# Patient Record
Sex: MALE | Race: WHITE | Employment: OTHER | URBAN - METROPOLITAN AREA
[De-identification: names, ages, dates, MRNs, and addresses within clinical notes are randomized per-mention and may not be internally consistent; named-entity substitution may affect disease eponyms.]

---

## 2018-04-30 ENCOUNTER — HOSPITAL ENCOUNTER (OUTPATIENT)
Dept: CT IMAGING | Facility: HOSPITAL | Age: 78
Discharge: HOME | End: 2018-04-30
Attending: INTERNAL MEDICINE
Payer: MEDICARE

## 2018-04-30 ENCOUNTER — HOSPITAL ENCOUNTER (OUTPATIENT)
Dept: GENERAL RADIOLOGY | Facility: HOSPITAL | Age: 78
Discharge: HOME | End: 2018-04-30
Attending: INTERNAL MEDICINE
Payer: MEDICARE

## 2018-04-30 DIAGNOSIS — K74.60 CIRRHOSIS OF LIVER WITHOUT ASCITES, UNSPECIFIED HEPATIC CIRRHOSIS TYPE (HCC): ICD-10-CM

## 2018-04-30 DIAGNOSIS — S29.9XXA INJURY OF CHEST WALL, INITIAL ENCOUNTER: ICD-10-CM

## 2018-04-30 DIAGNOSIS — R16.2 HEPATOSPLENOMEGALY: ICD-10-CM

## 2018-04-30 PROCEDURE — 74150 CT ABDOMEN W/O CONTRAST: CPT | Performed by: INTERNAL MEDICINE

## 2018-04-30 PROCEDURE — 71046 X-RAY EXAM CHEST 2 VIEWS: CPT | Performed by: INTERNAL MEDICINE

## 2018-04-30 PROCEDURE — 71100 X-RAY EXAM RIBS UNI 2 VIEWS: CPT | Performed by: INTERNAL MEDICINE

## 2018-06-15 ENCOUNTER — HOSPITAL ENCOUNTER (EMERGENCY)
Facility: HOSPITAL | Age: 78
Discharge: HOME | DRG: 516 | End: 2018-06-15
Attending: EMERGENCY MEDICINE
Payer: MEDICARE

## 2018-06-15 ENCOUNTER — APPOINTMENT (OUTPATIENT)
Dept: CT IMAGING | Facility: HOSPITAL | Age: 78
DRG: 516 | End: 2018-06-15
Attending: EMERGENCY MEDICINE
Payer: MEDICARE

## 2018-06-15 VITALS
WEIGHT: 240 LBS | BODY MASS INDEX: 33 KG/M2 | OXYGEN SATURATION: 99 % | SYSTOLIC BLOOD PRESSURE: 164 MMHG | DIASTOLIC BLOOD PRESSURE: 94 MMHG | TEMPERATURE: 98 F | HEART RATE: 88 BPM | RESPIRATION RATE: 18 BRPM

## 2018-06-15 DIAGNOSIS — M54.9 SEVERE BACK PAIN: Primary | ICD-10-CM

## 2018-06-15 DIAGNOSIS — S32.009A CLOSED FRACTURE DISLOCATION OF LUMBAR SPINE, INITIAL ENCOUNTER (HCC): ICD-10-CM

## 2018-06-15 PROCEDURE — 81001 URINALYSIS AUTO W/SCOPE: CPT | Performed by: EMERGENCY MEDICINE

## 2018-06-15 PROCEDURE — 96374 THER/PROPH/DIAG INJ IV PUSH: CPT

## 2018-06-15 PROCEDURE — 99285 EMERGENCY DEPT VISIT HI MDM: CPT

## 2018-06-15 PROCEDURE — 85025 COMPLETE CBC W/AUTO DIFF WBC: CPT | Performed by: EMERGENCY MEDICINE

## 2018-06-15 PROCEDURE — 96376 TX/PRO/DX INJ SAME DRUG ADON: CPT

## 2018-06-15 PROCEDURE — 72131 CT LUMBAR SPINE W/O DYE: CPT | Performed by: EMERGENCY MEDICINE

## 2018-06-15 PROCEDURE — 80048 BASIC METABOLIC PNL TOTAL CA: CPT | Performed by: EMERGENCY MEDICINE

## 2018-06-15 RX ORDER — MORPHINE SULFATE 4 MG/ML
2 INJECTION, SOLUTION INTRAMUSCULAR; INTRAVENOUS ONCE
Status: COMPLETED | OUTPATIENT
Start: 2018-06-15 | End: 2018-06-15

## 2018-06-15 RX ORDER — LIDOCAINE 50 MG/G
1 PATCH TOPICAL EVERY 24 HOURS
Qty: 30 PATCH | Refills: 0 | Status: SHIPPED | OUTPATIENT
Start: 2018-06-15 | End: 2019-06-13

## 2018-06-15 RX ORDER — HYDROCODONE BITARTRATE AND ACETAMINOPHEN 5; 325 MG/1; MG/1
1-2 TABLET ORAL EVERY 6 HOURS PRN
Qty: 12 TABLET | Refills: 0 | Status: SHIPPED | OUTPATIENT
Start: 2018-06-15 | End: 2018-06-16

## 2018-06-15 RX ORDER — HYDROCODONE BITARTRATE AND ACETAMINOPHEN 5; 325 MG/1; MG/1
1-2 TABLET ORAL EVERY 6 HOURS PRN
Qty: 12 TABLET | Refills: 0 | Status: ON HOLD | OUTPATIENT
Start: 2018-06-15 | End: 2018-06-20

## 2018-06-15 RX ORDER — DIAZEPAM 5 MG/1
5 TABLET ORAL ONCE
Status: COMPLETED | OUTPATIENT
Start: 2018-06-15 | End: 2018-06-15

## 2018-06-15 RX ORDER — MORPHINE SULFATE 4 MG/ML
4 INJECTION, SOLUTION INTRAMUSCULAR; INTRAVENOUS ONCE
Status: COMPLETED | OUTPATIENT
Start: 2018-06-15 | End: 2018-06-15

## 2018-06-15 NOTE — ED PROVIDER NOTES
Patient Seen in: [facility] AND [facility] Emergency Department    History   Patient presents with:  Back Pain (musculoskeletal)    Stated Complaint: Fall; Back Pain    HPI    69-year-old male patient presents complaining of severe mid low back pain that has bee use: No                Review of Systems    Positive for stated complaint: Fall; Back Pain  Other systems are as noted in HPI. Constitutional and vital signs reviewed. All other systems reviewed and negative except as noted above.     Physical Exam WITH DIFFERENTIAL WITH PLATELET    Narrative: The following orders were created for panel order CBC WITH DIFFERENTIAL WITH PLATELET.   Procedure                               Abnormality         Status                     --------- PM    START taking these medications    !! HYDROcodone-acetaminophen 5-325 MG Oral Tab  Take 1-2 tablets by mouth every 6 (six) hours as needed for Pain., Print Script, Disp-12 tablet, R-0    !! HYDROcodone-acetaminophen 5-325 MG Oral Tab  Take 1-2 tablets

## 2018-06-15 NOTE — ED INITIAL ASSESSMENT (HPI)
Pt here for c/o low/mid back pain s/p fall may 31th. Had xrays done with pcp. Given meloxicam. No improvement in pain, pain got worse. Denies any new bowel/bladder issues. Pt with compression fx on xray, has appt with dr Fredo Kirk on July 6.   Pt states h

## 2018-06-16 ENCOUNTER — HOSPITAL ENCOUNTER (INPATIENT)
Facility: HOSPITAL | Age: 78
LOS: 4 days | Discharge: HOME HEALTH | DRG: 516 | End: 2018-06-20
Attending: EMERGENCY MEDICINE | Admitting: INTERNAL MEDICINE
Payer: MEDICARE

## 2018-06-16 DIAGNOSIS — S32.020A CLOSED COMPRESSION FRACTURE OF SECOND LUMBAR VERTEBRA, INITIAL ENCOUNTER: Primary | ICD-10-CM

## 2018-06-16 PROBLEM — R73.9 HYPERGLYCEMIA: Status: ACTIVE | Noted: 2018-06-16

## 2018-06-16 PROBLEM — S32.010A CLOSED COMPRESSION FRACTURE OF FIRST LUMBAR VERTEBRA (HCC): Status: ACTIVE | Noted: 2018-06-16

## 2018-06-16 PROCEDURE — 94660 CPAP INITIATION&MGMT: CPT

## 2018-06-16 PROCEDURE — 93010 ELECTROCARDIOGRAM REPORT: CPT | Performed by: INTERNAL MEDICINE

## 2018-06-16 PROCEDURE — 96374 THER/PROPH/DIAG INJ IV PUSH: CPT

## 2018-06-16 PROCEDURE — 93005 ELECTROCARDIOGRAM TRACING: CPT

## 2018-06-16 PROCEDURE — 99285 EMERGENCY DEPT VISIT HI MDM: CPT

## 2018-06-16 RX ORDER — LIDOCAINE 50 MG/G
1 PATCH TOPICAL EVERY 24 HOURS
Status: DISCONTINUED | OUTPATIENT
Start: 2018-06-16 | End: 2018-06-20

## 2018-06-16 RX ORDER — ROPINIROLE 0.5 MG/1
0.5 TABLET, FILM COATED ORAL NIGHTLY
Status: DISCONTINUED | OUTPATIENT
Start: 2018-06-16 | End: 2018-06-20

## 2018-06-16 RX ORDER — QUETIAPINE 25 MG/1
25 TABLET, FILM COATED ORAL 2 TIMES DAILY WITH MEALS
Status: DISCONTINUED | OUTPATIENT
Start: 2018-06-16 | End: 2018-06-20

## 2018-06-16 RX ORDER — OYSTER SHELL CALCIUM WITH VITAMIN D 500; 200 MG/1; [IU]/1
1 TABLET, FILM COATED ORAL 2 TIMES DAILY
Status: DISCONTINUED | OUTPATIENT
Start: 2018-06-16 | End: 2018-06-20

## 2018-06-16 RX ORDER — POLYETHYLENE GLYCOL 3350 17 G/17G
17 POWDER, FOR SOLUTION ORAL DAILY PRN
Status: DISCONTINUED | OUTPATIENT
Start: 2018-06-16 | End: 2018-06-20

## 2018-06-16 RX ORDER — HYDROCODONE BITARTRATE AND ACETAMINOPHEN 5; 325 MG/1; MG/1
1-2 TABLET ORAL EVERY 6 HOURS PRN
Status: DISPENSED | OUTPATIENT
Start: 2018-06-16 | End: 2018-06-18

## 2018-06-16 RX ORDER — SODIUM CHLORIDE 9 MG/ML
INJECTION, SOLUTION INTRAVENOUS CONTINUOUS
Status: ACTIVE | OUTPATIENT
Start: 2018-06-16 | End: 2018-06-16

## 2018-06-16 RX ORDER — ACETAMINOPHEN 325 MG/1
650 TABLET ORAL EVERY 6 HOURS PRN
Status: DISCONTINUED | OUTPATIENT
Start: 2018-06-16 | End: 2018-06-20

## 2018-06-16 RX ORDER — ENOXAPARIN SODIUM 100 MG/ML
40 INJECTION SUBCUTANEOUS DAILY
Status: DISCONTINUED | OUTPATIENT
Start: 2018-06-16 | End: 2018-06-17

## 2018-06-16 RX ORDER — CALCIUM CARBONATE/VITAMIN D3 600 MG-10
1 TABLET ORAL 2 TIMES DAILY
COMMUNITY
End: 2018-09-20 | Stop reason: ALTCHOICE

## 2018-06-16 RX ORDER — PANTOPRAZOLE SODIUM 40 MG/1
40 TABLET, DELAYED RELEASE ORAL
Status: DISCONTINUED | OUTPATIENT
Start: 2018-06-17 | End: 2018-06-20

## 2018-06-16 RX ORDER — DOCUSATE SODIUM 100 MG/1
100 CAPSULE, LIQUID FILLED ORAL 2 TIMES DAILY
Status: DISCONTINUED | OUTPATIENT
Start: 2018-06-16 | End: 2018-06-20

## 2018-06-16 RX ORDER — SODIUM PHOSPHATE, DIBASIC AND SODIUM PHOSPHATE, MONOBASIC 7; 19 G/133ML; G/133ML
1 ENEMA RECTAL ONCE AS NEEDED
Status: DISCONTINUED | OUTPATIENT
Start: 2018-06-16 | End: 2018-06-20

## 2018-06-16 RX ORDER — NITROGLYCERIN 0.4 MG/1
0.4 TABLET SUBLINGUAL EVERY 5 MIN PRN
Status: DISCONTINUED | OUTPATIENT
Start: 2018-06-16 | End: 2018-06-20

## 2018-06-16 RX ORDER — METOPROLOL SUCCINATE 25 MG/1
12.5 TABLET, EXTENDED RELEASE ORAL DAILY
Status: DISCONTINUED | OUTPATIENT
Start: 2018-06-17 | End: 2018-06-20

## 2018-06-16 RX ORDER — MEMANTINE HYDROCHLORIDE 5 MG/1
5 TABLET ORAL DAILY
Status: DISCONTINUED | OUTPATIENT
Start: 2018-06-16 | End: 2018-06-20

## 2018-06-16 RX ORDER — ALFUZOSIN HYDROCHLORIDE 10 MG/1
10 TABLET, EXTENDED RELEASE ORAL DAILY
Status: DISCONTINUED | OUTPATIENT
Start: 2018-06-17 | End: 2018-06-20

## 2018-06-16 RX ORDER — BISACODYL 10 MG
10 SUPPOSITORY, RECTAL RECTAL
Status: DISCONTINUED | OUTPATIENT
Start: 2018-06-16 | End: 2018-06-20

## 2018-06-16 RX ORDER — SODIUM CHLORIDE 0.9 % (FLUSH) 0.9 %
3 SYRINGE (ML) INJECTION AS NEEDED
Status: DISCONTINUED | OUTPATIENT
Start: 2018-06-16 | End: 2018-06-20

## 2018-06-16 RX ORDER — PAROXETINE HYDROCHLORIDE 20 MG/1
20 TABLET, FILM COATED ORAL EVERY MORNING
Status: DISCONTINUED | OUTPATIENT
Start: 2018-06-17 | End: 2018-06-20

## 2018-06-16 NOTE — ED PROVIDER NOTES
Patient Seen in: [facility] AND [facility] Emergency Department    History   Patient presents with:  Back Pain (musculoskeletal)    Stated Complaint: Back pain, was seen yesterday but left ama because he didnt want to be admitted    HPI    60-year-old male not o 6/26/1994  Smokeless tobacco: Never Used                      Alcohol use:  No                Review of Systems    Positive for stated complaint: Back pain, was seen yesterday but left ama because he didnt want to be admitted  Other systems are as noted in 6/13/2018  DATE OF SERVICE: 06.13.2018 LUMBAR SPINE RADIOGRAPHS INDICATION: Acute midline low back pain. COMPARISON: April 30, 2018 abdomen CT. TECHNIQUE: AP, lateral, flexion, and extension, 4 views.  FINDINGS: There are mild L1 and L2 superior endplate co fracture fragment. There is moderate depression of the superior endplate of L2. There is no significant retropulsion. There is a chronic appearing compression fracture of the superior endplate of L1.   Remaining lumbar vertebral bodies are well maintaine intervention.           Disposition and Plan     Clinical Impression:  Closed compression fracture of second lumbar vertebra, initial encounter ([facility] [address]  (primary encounter diagnosis)    Disposition:  Admit  6/16/2018 10:03 am    Follow-up:  No follow-up provi

## 2018-06-16 NOTE — H&P
[facility]    PATIENT'S NAME: RUBIA COATS   ATTENDING PHYSICIAN: Geovanna Holly.  Brook Riddle MD   PATIENT ACCOUNT#:   [id number]    LOCATION:  [address] [place] [address] [facility]   [id number]       YOB: 1940  ADMISSION DATE:       06/16/ subsequently did accept admission to the hospital.  The emergency room physician felt that he may be a candidate for kyphoplasty and did contact Interventional Radiology for an evaluation of this potential treatment.     PAST MEDICAL HISTORY:  The patient's blood in his stool, black stool, diarrhea, or significant constipation history.   He suffers from apparently severe balance disorder in that he relates multiple falls, falling out of his truck, falling in the garage, falling over his grandchildren's toys, f stated, show an acute-appearing fracture superior body of L2 with a 7 mm displacement of the fracture fragment and multiple degenerative joint disease most significantly at L3-4 and L4-5. No electrocardiogram is available which is recent.     LIDIA

## 2018-06-16 NOTE — PLAN OF CARE
Problem: Patient/Family Goals  Goal: Patient/Family Long Term Goal  Patient's Long Term Goal: to have kyphoplasty and less pain.     Interventions:  - plan for kypho on 6/28  -pain medicaions  - See additional Care Plan goals for specific interventions   Ou appropriate  Outcome: Progressing  See MAR an flowsheets. PRN norco avaliable.     Problem: SAFETY ADULT - FALL  Goal: Free from fall injury  INTERVENTIONS:  - Assess pt frequently for physical needs  - Identify cognitive and physical deficits and behaviors
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
## 2018-06-16 NOTE — PROGRESS NOTES
Therapeutic interchange from tamsulosin HCl (FLOMAX) cap 0.4 mg to Alfuzosin HCl ER (UROXATRAL) 24 hr tab 10 mg per P&T approved protocol.      Harmeet RojasD

## 2018-06-16 NOTE — CERTIFICATION
**Certification    PHYSICIAN Certification of Need for Inpatient Hospitalization    Based on the his current state of illness, Elia Peterson requires inpatient hospitalization for his acute compression fracture L2 vertebrae.   This requires inpatient medical

## 2018-06-17 PROCEDURE — 80053 COMPREHEN METABOLIC PANEL: CPT | Performed by: INTERNAL MEDICINE

## 2018-06-17 PROCEDURE — 83036 HEMOGLOBIN GLYCOSYLATED A1C: CPT | Performed by: INTERNAL MEDICINE

## 2018-06-17 PROCEDURE — 87186 SC STD MICRODIL/AGAR DIL: CPT | Performed by: INTERNAL MEDICINE

## 2018-06-17 PROCEDURE — 87077 CULTURE AEROBIC IDENTIFY: CPT | Performed by: INTERNAL MEDICINE

## 2018-06-17 PROCEDURE — 87205 SMEAR GRAM STAIN: CPT | Performed by: INTERNAL MEDICINE

## 2018-06-17 PROCEDURE — 80061 LIPID PANEL: CPT | Performed by: INTERNAL MEDICINE

## 2018-06-17 PROCEDURE — 87070 CULTURE OTHR SPECIMN AEROBIC: CPT | Performed by: INTERNAL MEDICINE

## 2018-06-17 PROCEDURE — 82140 ASSAY OF AMMONIA: CPT | Performed by: INTERNAL MEDICINE

## 2018-06-17 PROCEDURE — A4216 STERILE WATER/SALINE, 10 ML: HCPCS | Performed by: INTERNAL MEDICINE

## 2018-06-17 PROCEDURE — 94660 CPAP INITIATION&MGMT: CPT

## 2018-06-17 PROCEDURE — 85025 COMPLETE CBC W/AUTO DIFF WBC: CPT | Performed by: INTERNAL MEDICINE

## 2018-06-17 PROCEDURE — 83735 ASSAY OF MAGNESIUM: CPT | Performed by: INTERNAL MEDICINE

## 2018-06-17 RX ORDER — ONDANSETRON 2 MG/ML
4 INJECTION INTRAMUSCULAR; INTRAVENOUS EVERY 6 HOURS PRN
Status: DISCONTINUED | OUTPATIENT
Start: 2018-06-17 | End: 2018-06-20

## 2018-06-17 NOTE — PLAN OF CARE
Problem: Patient/Family Goals  Goal: Patient/Family Long Term Goal  Patient's Long Term Goal: to have kyphoplasty and less pain.     Interventions:  - plan for kypho on 6/28  -pain medicaions  - See additional Care Plan goals for specific interventions    O Progressing  Patient aware of prn pain meds. C/o of pain but declined pain meds at this time.     Problem: SAFETY ADULT - FALL  Goal: Free from fall injury  INTERVENTIONS:  - Assess pt frequently for physical needs  - Identify cognitive and physical deficit on Monday with Dr. Mike Puri. Will continue to monitor.

## 2018-06-17 NOTE — PROGRESS NOTES
[address] Patient Status:  Inpatient    1940 MRN [id number]   Location [facility] 5SW/SE Attending Aaron Cary MD   Hosp Day # 1 PCP Ludy Wan MD     Yvette Grewal is a 69year old male patient. current outpatient prescriptions on file.     No Known Allergies    Principal Problem:    Closed compression fracture of second lumbar vertebra (HCC)  Active Problems:    Hypercholesterolemia    RODRIGO (obstructive sleep apnea)    CA prostate, adenoca ([facility] [address] meds  Appears to be anxious/depressed).      Plan:   (Will continue present meds  Discussed pain scale with nurse  Patient sates her threw up this am but he did not report it to the nurse  Tentatively planning kyphoplasty in am if deemed to be helpful by in

## 2018-06-17 NOTE — PLAN OF CARE
Patient: Rosita Rich  MRN: [id number]  : 1940  Allergies: No Known Allergies      IR MD/ APN Pre-Procedure Plan  (Inpatients Only)    Date of IR Procedure: 2018  Procedure to be performed: L2 kyphoplasty  Laterality: N/A  Room Modality: [place]

## 2018-06-17 NOTE — PLAN OF CARE
Problem: Patient/Family Goals  Goal: Patient/Family Long Term Goal  Patient's Long Term Goal: to have kyphoplasty and less pain.     Interventions:  - plan for kypho on 6/28  -pain medicaions  - See additional Care Plan goals for specific interventions    O per [facility]    Problem: SAFETY ADULT - FALL  Goal: Free from fall injury  INTERVENTIONS:  - Assess pt frequently for physical needs  - Identify cognitive and physical deficits and behaviors that affect risk of falls.   - [place] fall precautions as indicated

## 2018-06-18 ENCOUNTER — ANESTHESIA EVENT (OUTPATIENT)
Dept: INTERVENTIONAL RADIOLOGY/VASCULAR | Facility: HOSPITAL | Age: 78
DRG: 516 | End: 2018-06-18
Payer: MEDICARE

## 2018-06-18 ENCOUNTER — APPOINTMENT (OUTPATIENT)
Dept: INTERVENTIONAL RADIOLOGY/VASCULAR | Facility: HOSPITAL | Age: 78
DRG: 516 | End: 2018-06-18
Attending: INTERNAL MEDICINE
Payer: MEDICARE

## 2018-06-18 PROBLEM — F03.90 DEMENTIA (HCC): Status: ACTIVE | Noted: 2018-06-18

## 2018-06-18 PROCEDURE — 22514 PERQ VERTEBRAL AUGMENTATION: CPT

## 2018-06-18 PROCEDURE — 0QS03ZZ REPOSITION LUMBAR VERTEBRA, PERCUTANEOUS APPROACH: ICD-10-PCS | Performed by: RADIOLOGY

## 2018-06-18 PROCEDURE — 85610 PROTHROMBIN TIME: CPT | Performed by: RADIOLOGY

## 2018-06-18 PROCEDURE — 0QU03JZ SUPPLEMENT LUMBAR VERTEBRA WITH SYNTHETIC SUBSTITUTE, PERCUTANEOUS APPROACH: ICD-10-PCS | Performed by: RADIOLOGY

## 2018-06-18 PROCEDURE — 85025 COMPLETE CBC W/AUTO DIFF WBC: CPT | Performed by: INTERNAL MEDICINE

## 2018-06-18 RX ORDER — HYDROMORPHONE HYDROCHLORIDE 1 MG/ML
0.2 INJECTION, SOLUTION INTRAMUSCULAR; INTRAVENOUS; SUBCUTANEOUS EVERY 5 MIN PRN
Status: DISCONTINUED | OUTPATIENT
Start: 2018-06-18 | End: 2018-06-18 | Stop reason: HOSPADM

## 2018-06-18 RX ORDER — LIDOCAINE HYDROCHLORIDE 10 MG/ML
INJECTION, SOLUTION EPIDURAL; INFILTRATION; INTRACAUDAL; PERINEURAL AS NEEDED
Status: DISCONTINUED | OUTPATIENT
Start: 2018-06-18 | End: 2018-06-18 | Stop reason: SURG

## 2018-06-18 RX ORDER — DEXAMETHASONE SODIUM PHOSPHATE 4 MG/ML
VIAL (ML) INJECTION AS NEEDED
Status: DISCONTINUED | OUTPATIENT
Start: 2018-06-18 | End: 2018-06-18 | Stop reason: SURG

## 2018-06-18 RX ORDER — SODIUM CHLORIDE, SODIUM LACTATE, POTASSIUM CHLORIDE, CALCIUM CHLORIDE 600; 310; 30; 20 MG/100ML; MG/100ML; MG/100ML; MG/100ML
INJECTION, SOLUTION INTRAVENOUS CONTINUOUS
Status: DISCONTINUED | OUTPATIENT
Start: 2018-06-18 | End: 2018-06-18 | Stop reason: HOSPADM

## 2018-06-18 RX ORDER — LIDOCAINE HYDROCHLORIDE 20 MG/ML
INJECTION, SOLUTION EPIDURAL; INFILTRATION; INTRACAUDAL; PERINEURAL
Status: COMPLETED
Start: 2018-06-18 | End: 2018-06-18

## 2018-06-18 RX ORDER — HYDROMORPHONE HYDROCHLORIDE 1 MG/ML
0.6 INJECTION, SOLUTION INTRAMUSCULAR; INTRAVENOUS; SUBCUTANEOUS EVERY 5 MIN PRN
Status: DISCONTINUED | OUTPATIENT
Start: 2018-06-18 | End: 2018-06-18 | Stop reason: HOSPADM

## 2018-06-18 RX ORDER — LIDOCAINE HYDROCHLORIDE 40 MG/ML
SOLUTION TOPICAL AS NEEDED
Status: DISCONTINUED | OUTPATIENT
Start: 2018-06-18 | End: 2018-06-18 | Stop reason: SURG

## 2018-06-18 RX ORDER — CEFAZOLIN SODIUM 1 G/3ML
INJECTION, POWDER, FOR SOLUTION INTRAMUSCULAR; INTRAVENOUS AS NEEDED
Status: DISCONTINUED | OUTPATIENT
Start: 2018-06-18 | End: 2018-06-18 | Stop reason: SURG

## 2018-06-18 RX ORDER — CEFAZOLIN SODIUM/WATER 2 G/20 ML
SYRINGE (ML) INTRAVENOUS
Status: COMPLETED
Start: 2018-06-18 | End: 2018-06-18

## 2018-06-18 RX ORDER — SODIUM CHLORIDE, SODIUM LACTATE, POTASSIUM CHLORIDE, CALCIUM CHLORIDE 600; 310; 30; 20 MG/100ML; MG/100ML; MG/100ML; MG/100ML
INJECTION, SOLUTION INTRAVENOUS CONTINUOUS PRN
Status: DISCONTINUED | OUTPATIENT
Start: 2018-06-18 | End: 2018-06-18 | Stop reason: SURG

## 2018-06-18 RX ORDER — EPHEDRINE SULFATE 50 MG/ML
INJECTION, SOLUTION INTRAVENOUS AS NEEDED
Status: DISCONTINUED | OUTPATIENT
Start: 2018-06-18 | End: 2018-06-18 | Stop reason: SURG

## 2018-06-18 RX ORDER — ALPRAZOLAM 0.25 MG/1
0.25 TABLET ORAL 3 TIMES DAILY PRN
Status: DISCONTINUED | OUTPATIENT
Start: 2018-06-18 | End: 2018-06-20

## 2018-06-18 RX ORDER — HYDROCODONE BITARTRATE AND ACETAMINOPHEN 5; 325 MG/1; MG/1
2 TABLET ORAL AS NEEDED
Status: DISCONTINUED | OUTPATIENT
Start: 2018-06-18 | End: 2018-06-18 | Stop reason: HOSPADM

## 2018-06-18 RX ORDER — HALOPERIDOL 5 MG/ML
0.25 INJECTION INTRAMUSCULAR ONCE AS NEEDED
Status: DISCONTINUED | OUTPATIENT
Start: 2018-06-18 | End: 2018-06-18 | Stop reason: HOSPADM

## 2018-06-18 RX ORDER — METOPROLOL TARTRATE 5 MG/5ML
2.5 INJECTION INTRAVENOUS ONCE
Status: DISCONTINUED | OUTPATIENT
Start: 2018-06-18 | End: 2018-06-18 | Stop reason: HOSPADM

## 2018-06-18 RX ORDER — SODIUM CHLORIDE 9 MG/ML
INJECTION, SOLUTION INTRAVENOUS
Status: DISPENSED
Start: 2018-06-18 | End: 2018-06-19

## 2018-06-18 RX ORDER — ROCURONIUM BROMIDE 10 MG/ML
INJECTION, SOLUTION INTRAVENOUS AS NEEDED
Status: DISCONTINUED | OUTPATIENT
Start: 2018-06-18 | End: 2018-06-18 | Stop reason: SURG

## 2018-06-18 RX ORDER — GLYCOPYRROLATE 0.2 MG/ML
INJECTION INTRAMUSCULAR; INTRAVENOUS AS NEEDED
Status: DISCONTINUED | OUTPATIENT
Start: 2018-06-18 | End: 2018-06-18 | Stop reason: SURG

## 2018-06-18 RX ORDER — HYDROMORPHONE HYDROCHLORIDE 1 MG/ML
0.4 INJECTION, SOLUTION INTRAMUSCULAR; INTRAVENOUS; SUBCUTANEOUS EVERY 5 MIN PRN
Status: DISCONTINUED | OUTPATIENT
Start: 2018-06-18 | End: 2018-06-18 | Stop reason: HOSPADM

## 2018-06-18 RX ORDER — HYDROCODONE BITARTRATE AND ACETAMINOPHEN 5; 325 MG/1; MG/1
1 TABLET ORAL AS NEEDED
Status: DISCONTINUED | OUTPATIENT
Start: 2018-06-18 | End: 2018-06-18 | Stop reason: HOSPADM

## 2018-06-18 RX ORDER — ONDANSETRON 2 MG/ML
4 INJECTION INTRAMUSCULAR; INTRAVENOUS ONCE AS NEEDED
Status: DISCONTINUED | OUTPATIENT
Start: 2018-06-18 | End: 2018-06-18 | Stop reason: HOSPADM

## 2018-06-18 RX ORDER — NALOXONE HYDROCHLORIDE 0.4 MG/ML
80 INJECTION, SOLUTION INTRAMUSCULAR; INTRAVENOUS; SUBCUTANEOUS AS NEEDED
Status: DISCONTINUED | OUTPATIENT
Start: 2018-06-18 | End: 2018-06-18 | Stop reason: HOSPADM

## 2018-06-18 RX ORDER — PHENYLEPHRINE HCL 10 MG/ML
VIAL (ML) INJECTION AS NEEDED
Status: DISCONTINUED | OUTPATIENT
Start: 2018-06-18 | End: 2018-06-18 | Stop reason: SURG

## 2018-06-18 RX ADMIN — LIDOCAINE HYDROCHLORIDE 4 ML: 40 SOLUTION TOPICAL at 14:03:00

## 2018-06-18 RX ADMIN — ROCURONIUM BROMIDE 10 MG: 10 INJECTION, SOLUTION INTRAVENOUS at 14:03:00

## 2018-06-18 RX ADMIN — SODIUM CHLORIDE, SODIUM LACTATE, POTASSIUM CHLORIDE, CALCIUM CHLORIDE: 600; 310; 30; 20 INJECTION, SOLUTION INTRAVENOUS at 14:57:00

## 2018-06-18 RX ADMIN — CEFAZOLIN SODIUM 2 G: 1 INJECTION, POWDER, FOR SOLUTION INTRAMUSCULAR; INTRAVENOUS at 14:17:00

## 2018-06-18 RX ADMIN — EPHEDRINE SULFATE 10 MG: 50 INJECTION, SOLUTION INTRAVENOUS at 14:25:00

## 2018-06-18 RX ADMIN — LIDOCAINE HYDROCHLORIDE 50 MG: 10 INJECTION, SOLUTION EPIDURAL; INFILTRATION; INTRACAUDAL; PERINEURAL at 14:03:00

## 2018-06-18 RX ADMIN — DEXAMETHASONE SODIUM PHOSPHATE 4 MG: 4 MG/ML VIAL (ML) INJECTION at 14:03:00

## 2018-06-18 RX ADMIN — PHENYLEPHRINE HCL 100 MCG: 10 MG/ML VIAL (ML) INJECTION at 14:25:00

## 2018-06-18 RX ADMIN — PHENYLEPHRINE HCL 100 MCG: 10 MG/ML VIAL (ML) INJECTION at 14:28:00

## 2018-06-18 RX ADMIN — EPHEDRINE SULFATE 20 MG: 50 INJECTION, SOLUTION INTRAVENOUS at 14:27:00

## 2018-06-18 RX ADMIN — PHENYLEPHRINE HCL 100 MCG: 10 MG/ML VIAL (ML) INJECTION at 14:30:00

## 2018-06-18 RX ADMIN — EPHEDRINE SULFATE 20 MG: 50 INJECTION, SOLUTION INTRAVENOUS at 14:29:00

## 2018-06-18 RX ADMIN — SODIUM CHLORIDE, SODIUM LACTATE, POTASSIUM CHLORIDE, CALCIUM CHLORIDE: 600; 310; 30; 20 INJECTION, SOLUTION INTRAVENOUS at 14:03:00

## 2018-06-18 RX ADMIN — PHENYLEPHRINE HCL 100 MCG: 10 MG/ML VIAL (ML) INJECTION at 14:35:00

## 2018-06-18 RX ADMIN — GLYCOPYRROLATE 0.2 MG: 0.2 INJECTION INTRAMUSCULAR; INTRAVENOUS at 14:03:00

## 2018-06-18 NOTE — PHYSICAL THERAPY NOTE
Chart reviewed      Pt new order in chart from primary care MD         Pt with noted compression fx     Plans for kyphoplasty today by IR later today      Discussed pt with RN staff     Requested new PT order / PT activity clearance order post procedure

## 2018-06-18 NOTE — PROGRESS NOTES
[facility]    Progress Note    Anjum Marques Patient Status:  Inpatient    1940 MRN [id number]   Location [facility] 5SW/SE Attending Saida Mccollum MD   Hosp Day # 2 PCP Rachana Najera MD     Subjective:     Constitut 06/18/2018   HGB 14.9 06/18/2018   HCT 44.0 06/18/2018   PLT 77 06/18/2018   INR 1.2 06/18/2018           Ekg 12-lead    Result Date: 6/16/2018  ECG Report  Interpretation  --------------------------       Assessment and Plan:   Principal Problem:    Close

## 2018-06-18 NOTE — PLAN OF CARE
Problem: Patient/Family Goals  Goal: Patient/Family Long Term Goal  Patient's Long Term Goal: to have kyphoplasty and less pain.     Interventions:  - plan for kypho on 6/28  -pain medicaions  - See additional Care Plan goals for specific interventions    O - FALL  Goal: Free from fall injury  INTERVENTIONS:  - Assess pt frequently for physical needs  - Identify cognitive and physical deficits and behaviors that affect risk of falls.   - [place] fall precautions as indicated by assessment.  - Educate pt/fami monitor.

## 2018-06-18 NOTE — CONSULTS
[facility]    Report of Consultation    Saw Farooq Patient Status:  Inpatient    1940 MRN [id number]   Location [facility] 5SW/SE Attending Ming Singh MD   Hosp Day # 2 PCP Fannie Dandy, MD     Date of Admissio Allergies    Medications:    Current Facility-Administered Medications:   •  ALPRAZolam (XANAX) tab 0.25 mg, 0.25 mg, Oral, TID PRN  •  ondansetron HCl (ZOFRAN) injection 4 mg, 4 mg, Intravenous, Q6H PRN  •  Normal Saline Flush 0.9 % injection 3 mL, 3 mL, %.  HEENT: Exam is unremarkable. Neck: Supple. Lungs: Clear bilaterally. Cardiac: Regular rate and rhythm. Abdomen:  Nontender. Extremities:  No lower extremity edema noted. Skin: Normal texture and turgor.   Back:  Point tenderness L2    Laboratory D MONICA  6/18/2018  11:10 AM

## 2018-06-18 NOTE — ANESTHESIA PREPROCEDURE EVALUATION
Anesthesia PreOp Note    HPI:     Troy Aggarwal is a 69year old male who presents for preoperative consultation requested by: * No surgeons listed *    Date of Surgery: 6/18/2018    * No procedures listed *  Indication: * No pre-op diagnosis entered * Hypercholesterolemia 6/23/2014   • Hypertension 6/23/2014   • Hyperuricemia 6/23/2014   • RODRIGO (obstructive sleep apnea) 6/23/2014   • Primary osteoarthritis involving multiple joints 9/3/2015   • Sleep apnea        Past Surgical History:  01/2012: KNEE REP rOPINIRole HCl 0.5 MG Oral Tab Take 1 tablet (0.5 mg total) by mouth daily.  (Patient taking differently: Take 0.5 mg by mouth nightly.  ) Disp: 90 tablet Rfl: 2 6/15/2018 at Unknown time   tamsulosin HCl (FLOMAX) 0.4 MG Oral Cap Take 1 capsule by mouth d Succinate ER (Toprol XL) 24 hr tab 12.5 mg 12.5 mg Oral Daily Brook Moritz, MD 12.5 mg at 06/18/18 0747   Pantoprazole Sodium (PROTONIX) EC tab 40 mg 40 mg Oral QAM AC Brook Moritz, MD Stopped at 06/18/18 0700   PARoxetine HCl (PAXIL) tab 20 mg 2 height is 1.803 m (5' 11\") and weight is 111.1 kg (245 lb). His oral temperature is 98.3 °F (36.8 °C). His blood pressure is 155/84 and his pulse is 76.  His respiration is 15 and oxygen saturation is 91%.    06/18/18  0405 06/18/18  0522 06/18/18  0745 06

## 2018-06-18 NOTE — WOUND PROGRESS NOTE
Wound Care Services  Attempting to see the pt., he was in the operating room, will re-see the pt. tomorrow. Spoke with the pt's nurse.

## 2018-06-18 NOTE — CM/SW NOTE
SW received MDO stating \"Please look into alternative discharge arrangements. Pt is currently at assisted living, but will require more care on discharge\". SW met w/ pt to discuss. Pt stated that he lives at home w/ wife in 3 story home.  Pt reported t

## 2018-06-18 NOTE — PLAN OF CARE
Problem: Patient/Family Goals  Goal: Patient/Family Long Term Goal  Patient's Long Term Goal: to have kyphoplasty and less pain.     Interventions:  - plan for kypho on 6/28  -pain medicaions  - See additional Care Plan goals for specific interventions    O rated 8/10 this evening, norco given per [facility]    Problem: SAFETY ADULT - FALL  Goal: Free from fall injury  INTERVENTIONS:  - Assess pt frequently for physical needs  - Identify cognitive and physical deficits and behaviors that affect risk of falls.   - Ins

## 2018-06-18 NOTE — PROCEDURES
[facility]  Procedure Note    Rob Palmermisael Patient Status:  Inpatient    1940 MRN [id number]   Location [facility] Attending Taylor Ojeda, [address] [facility] Se Day # 2 PCP Markie Franklin MD     Procedure: L2

## 2018-06-18 NOTE — SLP NOTE
Pt is NPO at this time for scheduled kyphoplasty today. Will f/u in PM as able per MD orders. RN aware.     Thank you,  Barry Rondon MA, [address] Pathologist  [facility]. [id number]

## 2018-06-18 NOTE — ANESTHESIA POSTPROCEDURE EVALUATION
Patient: Ludivina Chahal    Procedure Summary     Date:  06/18/18 Room / Location:  [place] [address].    Anesthesia Start:  8227 Anesthesia Stop:      Procedure:  IR KYPHOPLASTY Diagnosis:  (Acute fracture lumbar level 2)    Scheduled P

## 2018-06-19 PROBLEM — D69.6 THROMBOCYTOPENIA (HCC): Status: ACTIVE | Noted: 2018-06-19

## 2018-06-19 PROCEDURE — 80048 BASIC METABOLIC PNL TOTAL CA: CPT | Performed by: INTERNAL MEDICINE

## 2018-06-19 PROCEDURE — 99212 OFFICE O/P EST SF 10 MIN: CPT

## 2018-06-19 PROCEDURE — 97116 GAIT TRAINING THERAPY: CPT

## 2018-06-19 PROCEDURE — 85025 COMPLETE CBC W/AUTO DIFF WBC: CPT | Performed by: INTERNAL MEDICINE

## 2018-06-19 PROCEDURE — 94660 CPAP INITIATION&MGMT: CPT

## 2018-06-19 PROCEDURE — 97535 SELF CARE MNGMENT TRAINING: CPT

## 2018-06-19 PROCEDURE — 97530 THERAPEUTIC ACTIVITIES: CPT

## 2018-06-19 PROCEDURE — 97166 OT EVAL MOD COMPLEX 45 MIN: CPT

## 2018-06-19 PROCEDURE — 84153 ASSAY OF PSA TOTAL: CPT | Performed by: INTERNAL MEDICINE

## 2018-06-19 PROCEDURE — 92610 EVALUATE SWALLOWING FUNCTION: CPT

## 2018-06-19 PROCEDURE — 97162 PT EVAL MOD COMPLEX 30 MIN: CPT

## 2018-06-19 RX ORDER — POTASSIUM CHLORIDE 20 MEQ/1
40 TABLET, EXTENDED RELEASE ORAL ONCE
Status: COMPLETED | OUTPATIENT
Start: 2018-06-19 | End: 2018-06-19

## 2018-06-19 NOTE — PROGRESS NOTES
[facility]  Progress Note    Maryse Pastor Patient Status:  Inpatient    1940 MRN [id number]   Location [facility] 5SW/SE Attending Audra Fernandez MD   Hosp Day # 3 PCP Carlie Matos MD       Subjective:   Back pain imp

## 2018-06-19 NOTE — PROGRESS NOTES
[facility]    Progress Note    Yvette Carmina Patient Status:  Inpatient    1940 MRN [id number]   Location [facility] 5SW/SE Attending Aaron Cary MD   Hosp Day # 3 PCP Ludy Wan MD     Subjective:     Constitut multiple joints    Depression    Cirrhosis of liver without ascites, unspecified hepatic cirrhosis type ([facility] [address]    Essential hypertension with goal blood pressure less than 130/80    Dementia    Thrombocytopenia (HCC)    Neck pain is improved status post kyp

## 2018-06-19 NOTE — WOUND PROGRESS NOTE
WOUND CARE NOTE      PLAN   Recommendations:  Turn schedules  Heels elevated using pillows, heel wedge or heel boots to offload heels  Use of lift equipment  To prevent sliding: decrease head of bed and elevate foot of bed as medical condition tolerates

## 2018-06-19 NOTE — OCCUPATIONAL THERAPY NOTE
OCCUPATIONAL THERAPY EVALUATION - INPATIENT      Room Number: 530/530-A  Evaluation Date: 6/19/2018  Type of Evaluation: Initial  Presenting Problem: kyphoplasty 2nd lumbar vertebra    Physician Order: IP Consult to Occupational Therapy  Reason for Therapy Pt wife verbalized understanding of spine care precautions. Noted pt with slight agitation toward wife as she provided him with feedback.      In this OT evaluation patient presents with the following impairments: decreased activity tolerance and endura Hepatosplenomegaly 7/21/2016   • High blood pressure    • Hypercholesterolemia 6/23/2014   • Hypertension 6/23/2014   • Hyperuricemia 6/23/2014   • RODRIGO (obstructive sleep apnea) 6/23/2014   • Primary osteoarthritis involving multiple joints 9/3/2015   • Sl is within functional limits     ACTIVITIES OF DAILY LIVING ASSESSMENT  AM-PAC ‘6-Clicks’ Inpatient Daily Activity Short Form  How much help from another person does the patient currently need…  -   Putting on and taking off regular lower body clothing?: A

## 2018-06-19 NOTE — PHYSICAL THERAPY NOTE
PHYSICAL THERAPY EVALUATION - INPATIENT     Room Number: 530/530-A  Evaluation Date: 6/19/2018  Type of Evaluation: Initial   Physician Order: PT Eval and Treat    Presenting Problem: L2 kyphoplasty  Reason for Therapy: Mobility Dysfunction and Discharge deficits in preparation for discharge. DISCHARGE RECOMMENDATIONS  PT Discharge Recommendations: Home with home health PT    PLAN  PT Treatment Plan: Bed mobility; Body mechanics; Endurance; Patient education;Gait training;Strengthening;Stair training;Trans Hyperuricemia 6/23/2014   • RODRIGO (obstructive sleep apnea) 6/23/2014   • Primary osteoarthritis involving multiple joints 9/3/2015   • Sleep apnea        Past Surgical History  Past Surgical History:  01/2012: KNEE REPLACEMENT SURGERY N/A  2009: REPAIR ROTA Impairment Score: 41.77%   Standardized Score (AM-PAC Scale): 45.44   CMS Modifier (G-Code): CK    FUNCTIONAL ABILITY STATUS  Gait Assessment   Gait Assistance: Supervision  Distance (ft): 150  Assistive Device: Rolling walker  Pattern: Ataxic  Stoop/Curb

## 2018-06-19 NOTE — SLP NOTE
ADULT SWALLOWING EVALUATION    ASSESSMENT    ASSESSMENT/OVERALL IMPRESSION:      Pt assessed sitting upright in chair. Pt self-fed solid and thin liquid trials. Bilabial seal adequate with no anterior loss.  Lingual skills adequate for bolus formation, prep hypertension with goal blood pressure less than 130/80    Dementia    Thrombocytopenia (HCC)      Past Medical History  Past Medical History:   Diagnosis Date   • CA prostate, adenoca ([facility] [address] 6/23/2014   • Cirrhosis of liver without ascites, unspecified hepa of Visits to Meet Established Goals: 0  Follow Up Needed: No  SLP Follow-up Date: 06/19/18    Thank you for your referral.   If you have any questions, please contact       Sherry Conteh M.S. CCC/SLP  Speech-Language Pathologist  Nasir Martinez

## 2018-06-20 VITALS
OXYGEN SATURATION: 90 % | SYSTOLIC BLOOD PRESSURE: 149 MMHG | HEIGHT: 71 IN | RESPIRATION RATE: 18 BRPM | HEART RATE: 69 BPM | TEMPERATURE: 99 F | BODY MASS INDEX: 34.3 KG/M2 | DIASTOLIC BLOOD PRESSURE: 86 MMHG | WEIGHT: 245 LBS

## 2018-06-20 PROCEDURE — A4216 STERILE WATER/SALINE, 10 ML: HCPCS | Performed by: INTERNAL MEDICINE

## 2018-06-20 PROCEDURE — 85025 COMPLETE CBC W/AUTO DIFF WBC: CPT | Performed by: INTERNAL MEDICINE

## 2018-06-20 PROCEDURE — 94660 CPAP INITIATION&MGMT: CPT

## 2018-06-20 PROCEDURE — 84132 ASSAY OF SERUM POTASSIUM: CPT | Performed by: INTERNAL MEDICINE

## 2018-06-20 RX ORDER — TRAMADOL HYDROCHLORIDE 50 MG/1
50 TABLET ORAL EVERY 6 HOURS PRN
Status: DISCONTINUED | OUTPATIENT
Start: 2018-06-20 | End: 2018-06-20

## 2018-06-20 RX ORDER — LIDOCAINE 50 MG/G
1 PATCH TOPICAL EVERY 24 HOURS
Status: DISCONTINUED | OUTPATIENT
Start: 2018-06-20 | End: 2018-06-20

## 2018-06-20 NOTE — DISCHARGE SUMMARY
[facility]    Discharge Summary    Vlad Gibsonyesica Patient Status:  Inpatient    1940 MRN [id number]   Location [facility] 5SW/SE Attending Garland Hodgkin, MD   Hosp Day # 4 PCP Winston Kimball MD     [facility] He and his wife agreed to make appointment. Patient also was noted to have low platelet count. Platelet count was stable and 68,000 on discharge.   Patient had been on meloxicam and this may have been the cause every 24 hours   Quantity:  30 patch  Refills:  0     Pantoprazole Sodium 40 MG Tbec  Commonly known as:  PROTONIX      Take 1 tablet (40 mg total) by mouth every morning before breakfast.   Quantity:  90 tablet  Refills:  0     PARoxetine HCl 20 MG Tabs

## 2018-06-20 NOTE — PLAN OF CARE
Problem: Patient/Family Goals  Goal: Patient/Family Long Term Goal  Patient's Long Term Goal: to have kyphoplasty and less pain.     Interventions:  - plan for kypho on 6/28  -pain medicaions  - See additional Care Plan goals for specific interventions    O frequently for physical needs  - Identify cognitive and physical deficits and behaviors that affect risk of falls.   - [place] fall precautions as indicated by assessment.  - Educate pt/family on patient safety including physical limitations  - Instruct p

## 2018-06-20 NOTE — PROGRESS NOTES
[place] [facility]    Progress Note    Rob Miranda Patient Status:  Inpatient    1940 MRN [id number]   Location [facility] 5SW/SE Attending Sandi Villeda MD   Hosp Day # 4 PCP Markie Franklin MD     Subjective:     Adeti Depression    Cirrhosis of liver without ascites, unspecified hepatic cirrhosis type ([facility] [address]    Essential hypertension with goal blood pressure less than 130/80    Dementia    Thrombocytopenia (HCC)    Overall, patient's pain is significantly improved status

## 2018-06-20 NOTE — PLAN OF CARE
Problem: Patient/Family Goals  Goal: Patient/Family Long Term Goal  Patient's Long Term Goal: to have kyphoplasty and less pain.     Interventions:  - plan for kypho on 6/28  -pain medicaions  - See additional Care Plan goals for specific interventions    O injury  INTERVENTIONS:  - Assess pt frequently for physical needs  - Identify cognitive and physical deficits and behaviors that affect risk of falls.   - [place] fall precautions as indicated by assessment.  - Educate pt/family on patient safety includin

## 2018-06-20 NOTE — HOME CARE LIAISON
Met with pt at the bedside. He is agreeable to [facility] services. Brochure and liaison card provided. Any pt questions addressed. Will follow.

## 2018-06-20 NOTE — CM/SW NOTE
Therapy recommendations are for Samira Levine. SW contacted wife/Nessa to notify of recommendations. Wife stated she would like for pt to return home. SW discussed wife stated that it can be difficult to care for pt at home.  Wife stated she believes she will be

## 2018-06-25 ENCOUNTER — HOSPITAL ENCOUNTER (OUTPATIENT)
Dept: MRI IMAGING | Facility: HOSPITAL | Age: 78
Discharge: HOME | End: 2018-06-25
Attending: CLINICAL NURSE SPECIALIST
Payer: MEDICARE

## 2018-06-25 DIAGNOSIS — S32.010G COMPRESSION FRACTURE OF FIRST LUMBAR VERTEBRA WITH DELAYED HEALING: ICD-10-CM

## 2018-06-25 PROCEDURE — 72148 MRI LUMBAR SPINE W/O DYE: CPT | Performed by: CLINICAL NURSE SPECIALIST

## 2018-06-29 ENCOUNTER — LAB REQUISITION (OUTPATIENT)
Dept: LAB | Facility: HOSPITAL | Age: 78
End: 2018-06-29
Payer: MEDICARE

## 2018-06-29 DIAGNOSIS — I10 ESSENTIAL (PRIMARY) HYPERTENSION: ICD-10-CM

## 2018-06-29 LAB
BASOPHILS # BLD: 0 K/UL (ref 0–0.2)
BASOPHILS NFR BLD: 0 %
EOSINOPHIL # BLD: 0.2 K/UL (ref 0–0.7)
EOSINOPHIL NFR BLD: 2 %
ERYTHROCYTE [DISTWIDTH] IN BLOOD BY AUTOMATED COUNT: 15.5 % (ref 11–15)
HCT VFR BLD AUTO: 47.7 % (ref 41–52)
HGB BLD-MCNC: 15.9 G/DL (ref 13.5–17.5)
LYMPHOCYTES # BLD: 1.7 K/UL (ref 1–4)
LYMPHOCYTES NFR BLD: 21 %
MCH RBC QN AUTO: 30.9 PG (ref 27–32)
MCHC RBC AUTO-ENTMCNC: 33.4 G/DL (ref 32–37)
MCV RBC AUTO: 92.6 FL (ref 80–100)
MONOCYTES # BLD: 0.6 K/UL (ref 0–1)
MONOCYTES NFR BLD: 8 %
NEUTROPHILS # BLD AUTO: 5.4 K/UL (ref 1.8–7.7)
NEUTROPHILS NFR BLD: 68 %
PLATELET # BLD AUTO: 128 K/UL (ref 140–400)
PMV BLD AUTO: 9.9 FL (ref 7.4–10.3)
RBC # BLD AUTO: 5.16 M/UL (ref 4.5–5.9)
WBC # BLD AUTO: 8 K/UL (ref 4–11)

## 2018-06-29 PROCEDURE — 85025 COMPLETE CBC W/AUTO DIFF WBC: CPT | Performed by: INTERNAL MEDICINE

## 2018-07-25 ENCOUNTER — HOSPITAL ENCOUNTER (OUTPATIENT)
Dept: MRI IMAGING | Age: 78
Discharge: HOME | End: 2018-07-25
Attending: PAIN MEDICINE
Payer: MEDICARE

## 2018-07-25 DIAGNOSIS — S32.000A COMPRESSION FRACTURE OF LUMBAR VERTEBRA (HCC): ICD-10-CM

## 2018-07-25 PROCEDURE — 72148 MRI LUMBAR SPINE W/O DYE: CPT | Performed by: PAIN MEDICINE

## 2019-01-08 ENCOUNTER — HOSPITAL ENCOUNTER (OUTPATIENT)
Dept: MAMMOGRAPHY | Facility: HOSPITAL | Age: 79
Discharge: HOME | End: 2019-01-08
Attending: INTERNAL MEDICINE
Payer: MEDICARE

## 2019-01-08 ENCOUNTER — HOSPITAL ENCOUNTER (OUTPATIENT)
Dept: ULTRASOUND IMAGING | Facility: HOSPITAL | Age: 79
Discharge: HOME | End: 2019-01-08
Attending: INTERNAL MEDICINE
Payer: MEDICARE

## 2019-01-08 DIAGNOSIS — N64.89 FULLNESS OF BREAST: ICD-10-CM

## 2019-01-08 PROCEDURE — 77066 DX MAMMO INCL CAD BI: CPT | Performed by: INTERNAL MEDICINE

## 2019-01-08 PROCEDURE — 76642 ULTRASOUND BREAST LIMITED: CPT | Performed by: INTERNAL MEDICINE

## 2019-06-13 PROBLEM — I49.1 ATRIAL PREMATURE BEATS: Status: ACTIVE | Noted: 2019-06-13

## 2019-12-19 PROBLEM — E11.9 TYPE 2 DIABETES MELLITUS WITHOUT COMPLICATION, WITHOUT LONG-TERM CURRENT USE OF INSULIN (HCC): Status: ACTIVE | Noted: 2019-12-19

## 2019-12-19 PROBLEM — R26.9 GAIT DISTURBANCE: Status: ACTIVE | Noted: 2019-12-19

## 2020-02-15 ENCOUNTER — HOSPITAL ENCOUNTER (INPATIENT)
Facility: HOSPITAL | Age: 80
LOS: 9 days | Discharge: NURSING, REHAB OR LONG-TERM CARE | DRG: 308 | End: 2020-02-24
Attending: EMERGENCY MEDICINE | Admitting: INTERNAL MEDICINE
Payer: MEDICARE

## 2020-02-15 DIAGNOSIS — I48.91 ATRIAL FIBRILLATION WITH RAPID VENTRICULAR RESPONSE (HCC): Primary | ICD-10-CM

## 2020-02-15 DIAGNOSIS — B37.2 CANDIDAL DERMATITIS: ICD-10-CM

## 2020-02-15 LAB
ANION GAP SERPL CALC-SCNC: 4 MMOL/L (ref 0–18)
BASOPHILS # BLD AUTO: 0.02 X10(3) UL (ref 0–0.2)
BASOPHILS NFR BLD AUTO: 0.3 %
BUN BLD-MCNC: 24 MG/DL (ref 7–18)
BUN/CREAT SERPL: 15.9 (ref 10–20)
CALCIUM BLD-MCNC: 8.5 MG/DL (ref 8.5–10.1)
CHLORIDE SERPL-SCNC: 113 MMOL/L (ref 98–112)
CO2 SERPL-SCNC: 29 MMOL/L (ref 21–32)
CREAT BLD-MCNC: 1.51 MG/DL (ref 0.7–1.3)
DEPRECATED RDW RBC AUTO: 61.8 FL (ref 35.1–46.3)
EOSINOPHIL # BLD AUTO: 0.12 X10(3) UL (ref 0–0.7)
EOSINOPHIL NFR BLD AUTO: 1.9 %
ERYTHROCYTE [DISTWIDTH] IN BLOOD BY AUTOMATED COUNT: 17 % (ref 11–15)
GLUCOSE BLD-MCNC: 112 MG/DL (ref 70–99)
HCT VFR BLD AUTO: 42.8 % (ref 39–53)
HGB BLD-MCNC: 13.4 G/DL (ref 13–17.5)
IMM GRANULOCYTES # BLD AUTO: 0.02 X10(3) UL (ref 0–1)
IMM GRANULOCYTES NFR BLD: 0.3 %
LYMPHOCYTES # BLD AUTO: 0.85 X10(3) UL (ref 1–4)
LYMPHOCYTES NFR BLD AUTO: 13.3 %
MCH RBC QN AUTO: 30.6 PG (ref 26–34)
MCHC RBC AUTO-ENTMCNC: 31.3 G/DL (ref 31–37)
MCV RBC AUTO: 97.7 FL (ref 80–100)
MONOCYTES # BLD AUTO: 0.67 X10(3) UL (ref 0.1–1)
MONOCYTES NFR BLD AUTO: 10.5 %
NEUTROPHILS # BLD AUTO: 4.71 X10 (3) UL (ref 1.5–7.7)
NEUTROPHILS # BLD AUTO: 4.71 X10(3) UL (ref 1.5–7.7)
NEUTROPHILS NFR BLD AUTO: 73.7 %
OSMOLALITY SERPL CALC.SUM OF ELEC: 307 MOSM/KG (ref 275–295)
PLATELET # BLD AUTO: 116 10(3)UL (ref 150–450)
POTASSIUM SERPL-SCNC: 4.6 MMOL/L (ref 3.5–5.1)
RBC # BLD AUTO: 4.38 X10(6)UL (ref 3.8–5.8)
SODIUM SERPL-SCNC: 146 MMOL/L (ref 136–145)
WBC # BLD AUTO: 6.4 X10(3) UL (ref 4–11)

## 2020-02-15 RX ORDER — RISPERIDONE 0.5 MG/1
0.5 TABLET, FILM COATED ORAL 2 TIMES DAILY
Status: DISCONTINUED | OUTPATIENT
Start: 2020-02-15 | End: 2020-02-15

## 2020-02-15 RX ORDER — NYSTATIN 100000 U/G
CREAM TOPICAL ONCE
Status: COMPLETED | OUTPATIENT
Start: 2020-02-15 | End: 2020-02-15

## 2020-02-15 RX ORDER — METOPROLOL SUCCINATE 100 MG/1
100 TABLET, EXTENDED RELEASE ORAL
Status: DISCONTINUED | OUTPATIENT
Start: 2020-02-16 | End: 2020-02-16

## 2020-02-15 RX ORDER — TAMSULOSIN HYDROCHLORIDE 0.4 MG/1
0.4 CAPSULE ORAL DAILY
Status: DISCONTINUED | OUTPATIENT
Start: 2020-02-16 | End: 2020-02-24

## 2020-02-15 RX ORDER — MEMANTINE HYDROCHLORIDE 5 MG/1
5 TABLET ORAL DAILY
Status: ON HOLD | COMMUNITY
End: 2020-02-23

## 2020-02-15 RX ORDER — ROSUVASTATIN CALCIUM 10 MG/1
10 TABLET, COATED ORAL NIGHTLY
Status: DISCONTINUED | OUTPATIENT
Start: 2020-02-15 | End: 2020-02-24

## 2020-02-15 RX ORDER — POLYETHYLENE GLYCOL 3350 17 G/17G
17 POWDER, FOR SOLUTION ORAL DAILY PRN
Status: DISCONTINUED | OUTPATIENT
Start: 2020-02-15 | End: 2020-02-24

## 2020-02-15 RX ORDER — ROPINIROLE 0.5 MG/1
0.5 TABLET, FILM COATED ORAL NIGHTLY
Status: DISCONTINUED | OUTPATIENT
Start: 2020-02-15 | End: 2020-02-16

## 2020-02-15 RX ORDER — PANTOPRAZOLE SODIUM 40 MG/1
40 TABLET, DELAYED RELEASE ORAL
Status: DISCONTINUED | OUTPATIENT
Start: 2020-02-16 | End: 2020-02-24

## 2020-02-15 RX ORDER — DOCUSATE SODIUM 100 MG/1
100 CAPSULE, LIQUID FILLED ORAL 2 TIMES DAILY
Status: DISCONTINUED | OUTPATIENT
Start: 2020-02-16 | End: 2020-02-24

## 2020-02-15 RX ORDER — NYSTATIN 100000 U/G
CREAM TOPICAL 2 TIMES DAILY
Status: DISCONTINUED | OUTPATIENT
Start: 2020-02-15 | End: 2020-02-24

## 2020-02-15 RX ORDER — METOPROLOL SUCCINATE 100 MG/1
100 TABLET, EXTENDED RELEASE ORAL DAILY
Status: DISCONTINUED | OUTPATIENT
Start: 2020-02-15 | End: 2020-02-15

## 2020-02-15 RX ORDER — RISPERIDONE 0.5 MG/1
0.5 TABLET, FILM COATED ORAL 2 TIMES DAILY
Status: ON HOLD | COMMUNITY
End: 2020-02-23

## 2020-02-15 RX ORDER — METOCLOPRAMIDE HYDROCHLORIDE 5 MG/ML
10 INJECTION INTRAMUSCULAR; INTRAVENOUS EVERY 8 HOURS PRN
Status: DISCONTINUED | OUTPATIENT
Start: 2020-02-15 | End: 2020-02-24

## 2020-02-15 RX ORDER — SODIUM PHOSPHATE, DIBASIC AND SODIUM PHOSPHATE, MONOBASIC 7; 19 G/133ML; G/133ML
1 ENEMA RECTAL ONCE AS NEEDED
Status: DISCONTINUED | OUTPATIENT
Start: 2020-02-15 | End: 2020-02-24

## 2020-02-15 RX ORDER — HEPARIN SODIUM 5000 [USP'U]/ML
5000 INJECTION, SOLUTION INTRAVENOUS; SUBCUTANEOUS EVERY 12 HOURS SCHEDULED
Status: DISCONTINUED | OUTPATIENT
Start: 2020-02-15 | End: 2020-02-15

## 2020-02-15 RX ORDER — DIVALPROEX SODIUM 250 MG/1
250 TABLET, DELAYED RELEASE ORAL 2 TIMES DAILY
Status: DISCONTINUED | OUTPATIENT
Start: 2020-02-15 | End: 2020-02-17

## 2020-02-15 RX ORDER — QUETIAPINE 25 MG/1
25 TABLET, FILM COATED ORAL NIGHTLY
Status: DISCONTINUED | OUTPATIENT
Start: 2020-02-15 | End: 2020-02-16

## 2020-02-15 RX ORDER — RANITIDINE 150 MG/1
150 CAPSULE ORAL EVERY EVENING
Status: ON HOLD | COMMUNITY
End: 2020-02-23

## 2020-02-15 RX ORDER — METOPROLOL SUCCINATE 100 MG/1
100 TABLET, EXTENDED RELEASE ORAL DAILY
Status: ON HOLD | COMMUNITY
End: 2020-02-23

## 2020-02-15 RX ORDER — PAROXETINE HYDROCHLORIDE 20 MG/1
20 TABLET, FILM COATED ORAL EVERY MORNING
Status: DISCONTINUED | OUTPATIENT
Start: 2020-02-16 | End: 2020-02-16

## 2020-02-15 RX ORDER — ACETAMINOPHEN 325 MG/1
650 TABLET ORAL EVERY 6 HOURS PRN
Status: DISCONTINUED | OUTPATIENT
Start: 2020-02-15 | End: 2020-02-24

## 2020-02-15 RX ORDER — FAMOTIDINE 20 MG/1
20 TABLET ORAL DAILY
Status: DISCONTINUED | OUTPATIENT
Start: 2020-02-16 | End: 2020-02-15

## 2020-02-15 RX ORDER — METOPROLOL SUCCINATE 25 MG/1
25 TABLET, EXTENDED RELEASE ORAL
Status: DISCONTINUED | OUTPATIENT
Start: 2020-02-15 | End: 2020-02-15

## 2020-02-15 RX ORDER — HALOPERIDOL 5 MG/ML
2 INJECTION INTRAMUSCULAR EVERY 4 HOURS PRN
Status: DISCONTINUED | OUTPATIENT
Start: 2020-02-15 | End: 2020-02-16

## 2020-02-15 RX ORDER — MEMANTINE HYDROCHLORIDE 10 MG/1
5 TABLET ORAL 2 TIMES DAILY
Status: DISCONTINUED | OUTPATIENT
Start: 2020-02-15 | End: 2020-02-15

## 2020-02-15 RX ORDER — ROSUVASTATIN CALCIUM 10 MG/1
10 TABLET, COATED ORAL NIGHTLY
COMMUNITY
End: 2020-05-28

## 2020-02-15 RX ORDER — SODIUM CHLORIDE 0.9 % (FLUSH) 0.9 %
3 SYRINGE (ML) INJECTION AS NEEDED
Status: DISCONTINUED | OUTPATIENT
Start: 2020-02-15 | End: 2020-02-24

## 2020-02-15 RX ORDER — ONDANSETRON 2 MG/ML
4 INJECTION INTRAMUSCULAR; INTRAVENOUS EVERY 6 HOURS PRN
Status: DISCONTINUED | OUTPATIENT
Start: 2020-02-15 | End: 2020-02-24

## 2020-02-15 RX ORDER — MAGNESIUM OXIDE 420 MG
420 TABLET ORAL DAILY
COMMUNITY

## 2020-02-15 RX ORDER — DILTIAZEM HYDROCHLORIDE 60 MG/1
60 TABLET, FILM COATED ORAL AS NEEDED
Status: COMPLETED | OUTPATIENT
Start: 2020-02-15 | End: 2020-02-19

## 2020-02-15 RX ORDER — MEMANTINE HYDROCHLORIDE 10 MG/1
5 TABLET ORAL DAILY
Status: DISCONTINUED | OUTPATIENT
Start: 2020-02-16 | End: 2020-02-16

## 2020-02-15 RX ORDER — BISACODYL 10 MG
10 SUPPOSITORY, RECTAL RECTAL
Status: DISCONTINUED | OUTPATIENT
Start: 2020-02-15 | End: 2020-02-24

## 2020-02-15 RX ORDER — DILTIAZEM HYDROCHLORIDE 5 MG/ML
10 INJECTION INTRAVENOUS ONCE
Status: COMPLETED | OUTPATIENT
Start: 2020-02-15 | End: 2020-02-15

## 2020-02-15 NOTE — ED PROVIDER NOTES
Patient Seen in: [facility] Emergency Department      History   Patient presents with:  Rash    Stated Complaint: rash    HPI    68 y/o male w/ dementia at home with his wife on Eliquis and metoprolol with history of A. fib but a month ago you are No      Alcohol/week: 0.0 standard drinks    Drug use: Not Currently      Types: Hydrocodone      Comment: Recently detoxed             Review of Systems    Positive for stated complaint: rash  Other systems are as noted in HPI.   Constitutional and vital s BUN 24 (*)     Creatinine 1.51 (*)     Calculated Osmolality 307 (*)     GFR, Non- 44 (*)     GFR, -American 50 (*)     All other components within normal limits   CBC W/ DIFFERENTIAL - Abnormal; Notable for the following componen (primary encounter diagnosis)  Candidal dermatitis    Disposition:  Admit  2/15/2020  6:42 pm    Follow-up:  No follow-up provider specified.   We recommend that you schedule follow up care with a primary care provider within the next three months to obtain

## 2020-02-15 NOTE — ED NOTES
Pt yelling about \"why am I here. \" \" I am not a patient person, I will not wait forever. \" \" a rash - how long will this keep me here this time. \"  Pt's wife states has rash to groin and has been wandering more frequently, has h/o dementia.  Pt is a/o x

## 2020-02-15 NOTE — ED INITIAL ASSESSMENT (HPI)
Hoa Mitchell presents for groin rash, possible UTI per wife. Pt has been having increasing wandering episodes, hx of dementia.

## 2020-02-16 PROBLEM — F01.51 MIXED VASCULAR AND NEURODEGENERATIVE DEMENTIA WITH BEHAVIORAL DISTURBANCE (HCC): Status: ACTIVE | Noted: 2020-02-16

## 2020-02-16 PROBLEM — F39 EPISODIC MOOD DISORDER (HCC): Status: ACTIVE | Noted: 2020-02-16

## 2020-02-16 LAB
ANION GAP SERPL CALC-SCNC: 4 MMOL/L (ref 0–18)
BUN BLD-MCNC: 24 MG/DL (ref 7–18)
BUN/CREAT SERPL: 16.7 (ref 10–20)
CALCIUM BLD-MCNC: 8.1 MG/DL (ref 8.5–10.1)
CHLORIDE SERPL-SCNC: 112 MMOL/L (ref 98–112)
CO2 SERPL-SCNC: 28 MMOL/L (ref 21–32)
CREAT BLD-MCNC: 1.44 MG/DL (ref 0.7–1.3)
DEPRECATED RDW RBC AUTO: 61.6 FL (ref 35.1–46.3)
ERYTHROCYTE [DISTWIDTH] IN BLOOD BY AUTOMATED COUNT: 17.1 % (ref 11–15)
GLUCOSE BLD-MCNC: 97 MG/DL (ref 70–99)
HAV IGM SER QL: 2.3 MG/DL (ref 1.6–2.6)
HCT VFR BLD AUTO: 40.5 % (ref 39–53)
HGB BLD-MCNC: 12.7 G/DL (ref 13–17.5)
MCH RBC QN AUTO: 30.6 PG (ref 26–34)
MCHC RBC AUTO-ENTMCNC: 31.4 G/DL (ref 31–37)
MCV RBC AUTO: 97.6 FL (ref 80–100)
OSMOLALITY SERPL CALC.SUM OF ELEC: 302 MOSM/KG (ref 275–295)
PLATELET # BLD AUTO: 100 10(3)UL (ref 150–450)
POTASSIUM SERPL-SCNC: 4.4 MMOL/L (ref 3.5–5.1)
RBC # BLD AUTO: 4.15 X10(6)UL (ref 3.8–5.8)
SODIUM SERPL-SCNC: 144 MMOL/L (ref 136–145)
WBC # BLD AUTO: 6.1 X10(3) UL (ref 4–11)

## 2020-02-16 PROCEDURE — 90792 PSYCH DIAG EVAL W/MED SRVCS: CPT | Performed by: OTHER

## 2020-02-16 RX ORDER — TRAZODONE HYDROCHLORIDE 100 MG/1
100 TABLET ORAL NIGHTLY PRN
Status: DISCONTINUED | OUTPATIENT
Start: 2020-02-16 | End: 2020-02-16

## 2020-02-16 RX ORDER — MEMANTINE HYDROCHLORIDE 10 MG/1
5 TABLET ORAL 2 TIMES DAILY
Status: DISCONTINUED | OUTPATIENT
Start: 2020-02-16 | End: 2020-02-20

## 2020-02-16 RX ORDER — ESCITALOPRAM OXALATE 10 MG/1
10 TABLET ORAL EVERY EVENING
Status: DISCONTINUED | OUTPATIENT
Start: 2020-02-16 | End: 2020-02-19

## 2020-02-16 RX ORDER — ALPRAZOLAM 0.25 MG/1
0.25 TABLET ORAL 2 TIMES DAILY
Status: DISCONTINUED | OUTPATIENT
Start: 2020-02-16 | End: 2020-02-17

## 2020-02-16 RX ORDER — OXYCODONE HYDROCHLORIDE 5 MG/1
10 TABLET ORAL EVERY 8 HOURS PRN
Status: DISCONTINUED | OUTPATIENT
Start: 2020-02-16 | End: 2020-02-18

## 2020-02-16 RX ORDER — METOPROLOL SUCCINATE 100 MG/1
100 TABLET, EXTENDED RELEASE ORAL ONCE
Status: COMPLETED | OUTPATIENT
Start: 2020-02-16 | End: 2020-02-16

## 2020-02-16 RX ORDER — IPRATROPIUM BROMIDE AND ALBUTEROL SULFATE 2.5; .5 MG/3ML; MG/3ML
3 SOLUTION RESPIRATORY (INHALATION) EVERY 6 HOURS PRN
Status: DISCONTINUED | OUTPATIENT
Start: 2020-02-16 | End: 2020-02-24

## 2020-02-16 RX ORDER — OLANZAPINE 2.5 MG/1
5 TABLET ORAL EVERY EVENING
Status: DISCONTINUED | OUTPATIENT
Start: 2020-02-16 | End: 2020-02-17

## 2020-02-16 RX ORDER — METOPROLOL SUCCINATE 100 MG/1
200 TABLET, EXTENDED RELEASE ORAL
Status: DISCONTINUED | OUTPATIENT
Start: 2020-02-17 | End: 2020-02-24

## 2020-02-16 RX ORDER — HALOPERIDOL 5 MG/ML
0.5 INJECTION INTRAMUSCULAR EVERY 2 HOUR PRN
Status: DISCONTINUED | OUTPATIENT
Start: 2020-02-16 | End: 2020-02-24

## 2020-02-16 NOTE — PLAN OF CARE
Vitals stable. Pt complains of left lower back pain. Tylenol and oxycodone administered; no change in pain level. Pt still confused. Wife and daughter visiting this afternoon; he gets upset and raises his voice at wife occassionally.  Pt keeps forgetting he pulses, skin color and temperature  - Assess for signs of decreased coronary artery perfusion - ex.  Angina  - Evaluate fluid balance, assess for edema, trend weights  Outcome: Progressing  Goal: Absence of cardiac arrhythmias or at baseline  Description  I

## 2020-02-16 NOTE — H&P
[facility] Hospitalist H&P     CC: Patient presents with:  Rash       PCP: Gurwinder Joe MD    Admission Date: 2/15/2020    ASSESSMENT / PLAN:   Mr. Cheryl Sierra is a 67year old male with PMH of HTN, cirrhosis, dementia with behavior disturbances, RODRIGO, T2DM, prosta male with PMH of HTN, cirrhosis, dementia with behavior disturbances, RODRIGO, T2DM, prostate cancer, chronic a fib who presented to the ED with a fib with RVR and progressive mental status changes.     Per last office note by PCP 12/19 patient has had progress • Heart Disorder Father    • Heart Attack Mother    • Hypertension Mother        Review of Systems  Comprehensive ROS reviewed and negative except for what's stated above.      OBJECTIVE:  /81   Pulse (!) 123   Temp 97.5 °F (36.4 °C)   Resp 16   Wt

## 2020-02-16 NOTE — CONSULTS
[facility]    PATIENT'S NAME: RUBIA COATS   ATTENDING PHYSICIAN: Maxim Mueller MD   CONSULTING PHYSICIAN: Afia Dumont MD   PATIENT ACCOUNT#:   [id number]    LOCATION:  [address] [place] [address] #:   [id number]       DATE OF BIRTH:  [birth year] currently complaining of back discomfort. VITAL SIGNS:  Temperature 97.6 degrees Fahrenheit, heart rate 85, respiratory rate 24, blood pressure 102/84. HEENT:  Sclerae anicteric. Head normocephalic. NECK:  Supple.   LUNGS:  Clear to auscultation anterio

## 2020-02-16 NOTE — PHYSICAL THERAPY NOTE
PHYSICAL THERAPY EVALUATION - INPATIENT     Room Number: 323/779-R  Evaluation Date: 2/16/2020  Type of Evaluation: Initial   Physician Order: PT Eval and Treat    Presenting Problem: admitted for progressive mental status change, atrial fib with RVR, can only lives with his elderly wife. Pt would benefit from long term memory care unit. Patient will benefit from continued IP PT services to address these deficits in preparation for discharge.     DISCHARGE RECOMMENDATIONS  PT Discharge Recommendations: Lo bedside and reported that pt was modified I in bed mobility and transfers and ambulates with either the SC or RW without any assistance. He only required assistance in dressing and IADLs. SUBJECTIVE  \"I don't know why I am here. \"    PHYSICAL THERAPY walker  Pattern: (increased neil, impulsive)  Stoop/Curb Assistance: Not tested  Comment : impaired insight, impaired safety awareness, impulsive, inconsisitently following commands    Bed Mobility:  NT    Transfers:  Min A with RW    Exercise/Education

## 2020-02-16 NOTE — PROGRESS NOTES
[facility] Hospitalist Progress Note     Reason for Admission: dementia with behavorial issues  PCP: Burgess Tawana MD     Assessment/Plan:     Principal Problem:    Atrial fibrillation with rapid ventricular response ([address]  Active Problems:    Candidal dermatit (112.5 kg), SpO2 94 %.     Temp:  [97.3 °F (36.3 °C)-97.9 °F (36.6 °C)] 97.6 °F (36.4 °C)  Pulse:  [] 85  Resp:  [16-24] 24  BP: ()/(67-91) 102/84      Intake/Output:    Intake/Output Summary (Last 24 hours) at 2/16/2020 1405  Last data filed at sodium  100 mg Oral BID   • nystatin   Topical BID   • apixaban  5 mg Oral BID   • Rosuvastatin Calcium  10 mg Oral Nightly     • diltiazem 5 mg/hr (02/16/20 1203)     oxyCODONE HCl, haloperidol lactate, Normal Saline Flush, acetaminophen, PEG 3350, magnes

## 2020-02-16 NOTE — CONSULTS
[facility]    Report of Consultation    Miguelangel Ferreira Patient Status:  Inpatient    1940 MRN [id number]   Location [facility] 3W/SW Attending Laxmi Holder MD   [facility] Day # 1 PCP Meliza Nicholas MD     Date of Admission:   like to go out he like to be home with his wife whom he is attached to and feels anxious when she is not around. The patient admitted that he has not been sleeping well. Patient denied any auditory visual hallucination.     The patient apparently has been Years: 25.00        Pack years: 25        Types: Cigarettes        Quit date: 1994        Years since quittin.6      Smokeless tobacco: Never Used    Alcohol use: No      Alcohol/week: 0.0 standard drinks    Drug use: Not Currently      Types: Hy Daily  Rosuvastatin Calcium (CRESTOR) tab 10 mg, 10 mg, Oral, Nightly      Memantine HCl 5 MG Oral Tab, Take 5 mg by mouth daily. Magnesium Oxide 420 MG Oral Tab, Take 420 mg by mouth daily.   Rosuvastatin Calcium 10 MG Oral Tab, Take 10 mg by mouth nightl temperature 97.6 °F (36.4 °C), temperature source Oral, resp. rate 24, height 73\", weight 112.5 kg (248 lb), SpO2 94 %. Mental Status Exam:     Stated age obese male in hospital gown laying down in his bed.   The patient presented anxious and restless o every 2 hour PRN for agitation.     Orders This Visit:  Orders Placed This Encounter      Urinalysis with Culture Reflex STAT      CBC With Differential With Platelet      Basic Metabolic Panel (8)      Urinalysis with Culture Reflex STAT      Basic Metabol

## 2020-02-17 LAB
ANION GAP SERPL CALC-SCNC: 8 MMOL/L (ref 0–18)
BUN BLD-MCNC: 21 MG/DL (ref 7–18)
BUN/CREAT SERPL: 15.1 (ref 10–20)
CALCIUM BLD-MCNC: 8 MG/DL (ref 8.5–10.1)
CHLORIDE SERPL-SCNC: 106 MMOL/L (ref 98–112)
CO2 SERPL-SCNC: 25 MMOL/L (ref 21–32)
CREAT BLD-MCNC: 1.39 MG/DL (ref 0.7–1.3)
GLUCOSE BLD-MCNC: 81 MG/DL (ref 70–99)
HAV IGM SER QL: 2.2 MG/DL (ref 1.6–2.6)
OSMOLALITY SERPL CALC.SUM OF ELEC: 290 MOSM/KG (ref 275–295)
POTASSIUM SERPL-SCNC: 4.5 MMOL/L (ref 3.5–5.1)
SODIUM SERPL-SCNC: 139 MMOL/L (ref 136–145)

## 2020-02-17 PROCEDURE — 99233 SBSQ HOSP IP/OBS HIGH 50: CPT | Performed by: OTHER

## 2020-02-17 RX ORDER — DILTIAZEM HYDROCHLORIDE 240 MG/1
240 CAPSULE, COATED, EXTENDED RELEASE ORAL DAILY
Status: DISCONTINUED | OUTPATIENT
Start: 2020-02-17 | End: 2020-02-22

## 2020-02-17 RX ORDER — LORAZEPAM 2 MG/ML
0.5 INJECTION INTRAMUSCULAR ONCE
Status: COMPLETED | OUTPATIENT
Start: 2020-02-17 | End: 2020-02-17

## 2020-02-17 RX ORDER — HALOPERIDOL 5 MG/ML
1 INJECTION INTRAMUSCULAR 3 TIMES DAILY
Status: DISCONTINUED | OUTPATIENT
Start: 2020-02-17 | End: 2020-02-19

## 2020-02-17 RX ORDER — LORAZEPAM 2 MG/ML
1 INJECTION INTRAMUSCULAR ONCE
Status: COMPLETED | OUTPATIENT
Start: 2020-02-17 | End: 2020-02-17

## 2020-02-17 RX ORDER — ZIPRASIDONE HYDROCHLORIDE 20 MG/1
20 CAPSULE ORAL 2 TIMES DAILY WITH MEALS
Status: DISCONTINUED | OUTPATIENT
Start: 2020-02-17 | End: 2020-02-17

## 2020-02-17 RX ORDER — VALPROIC ACID 250 MG/5ML
250 SOLUTION ORAL 2 TIMES DAILY
Status: DISCONTINUED | OUTPATIENT
Start: 2020-02-17 | End: 2020-02-20

## 2020-02-17 RX ORDER — LORAZEPAM 2 MG/ML
1 INJECTION INTRAMUSCULAR ONCE
Status: COMPLETED | OUTPATIENT
Start: 2020-02-18 | End: 2020-02-17

## 2020-02-17 RX ORDER — HALOPERIDOL 5 MG/ML
3 INJECTION INTRAMUSCULAR ONCE
Status: COMPLETED | OUTPATIENT
Start: 2020-02-17 | End: 2020-02-17

## 2020-02-17 RX ORDER — HALOPERIDOL 5 MG/ML
2 INJECTION INTRAMUSCULAR ONCE
Status: COMPLETED | OUTPATIENT
Start: 2020-02-18 | End: 2020-02-17

## 2020-02-17 RX ORDER — LORAZEPAM 2 MG/ML
0.5 INJECTION INTRAMUSCULAR EVERY 4 HOURS PRN
Status: DISCONTINUED | OUTPATIENT
Start: 2020-02-17 | End: 2020-02-19

## 2020-02-17 RX ORDER — QUETIAPINE FUMARATE 50 MG/1
100 TABLET, EXTENDED RELEASE ORAL EVERY EVENING
Status: DISCONTINUED | OUTPATIENT
Start: 2020-02-17 | End: 2020-02-19

## 2020-02-17 RX ORDER — QUETIAPINE 25 MG/1
25 TABLET, FILM COATED ORAL 2 TIMES DAILY
Status: DISCONTINUED | OUTPATIENT
Start: 2020-02-17 | End: 2020-02-19

## 2020-02-17 RX ORDER — QUETIAPINE 25 MG/1
100 TABLET, FILM COATED ORAL ONCE
Status: COMPLETED | OUTPATIENT
Start: 2020-02-17 | End: 2020-02-17

## 2020-02-17 NOTE — OCCUPATIONAL THERAPY NOTE
Attempted to see pt for OT evaluation. Per RN, pt was agitated and received Haldol and Ativan- pt not appropriate to see this morning. Will re-attempt in PM as appropriate.

## 2020-02-17 NOTE — PROGRESS NOTES
[address] [facility] Cardiology  Progress Note    Kimmy Morris Patient Status:  Inpatient    1940 MRN [id number]   Location [facility] 3W/SW Attending Emily Montiel MD   Hosp Day # 2 PCP Hue Greenberg MD     Impression:  IMPRESSION:    1. QPM  LORazepam (ATIVAN) injection 0.5 mg, 0.5 mg, Intravenous, Q4H PRN  lidocaine-menthol 4-1 % 1 patch, 1 patch, Transdermal, Daily  oxyCODONE HCl (OXY-IR) cap/tab 10 mg, 10 mg, Oral, Q8H PRN  Metoprolol Succinate ER (Toprol XL) 24 hr tab 200 mg, 200 mg, 21 02/17/2020    CREATSERUM 1.39 02/17/2020    GLU 81 02/17/2020    CA 8.0 02/17/2020    MG 2.2 02/17/2020     No results for input(s): TROP, CK in the last 168 hours. MRI head 11/19:   IMPRESSION:  Signal abnormality in the cerebral white matter,

## 2020-02-17 NOTE — PLAN OF CARE
Problem: Patient Centered Care  Goal: Patient preferences are identified and integrated in the patient's plan of care  Description  Interventions:  - What would you like us to know as we care for you? Lives with wife.    - Provide timely, complete, and ac EKG if indicated  - Evaluate effectiveness of antiarrhythmic and heart rate control medications as ordered  - Initiate emergency measures for life threatening arrhythmias  - Monitor electrolytes and administer replacement therapy as ordered  Outcome: Progr

## 2020-02-17 NOTE — PROGRESS NOTES
[facility] Hospitalist Progress Note     Reason for Admission: dementia with behavorial issues  PCP: Kwadwo Polo MD     Assessment/Plan:     Principal Problem:    Atrial fibrillation with rapid ventricular response ([address]  Active Problems:    Candidal dermatit haldol x 1 and IM ativan x 2. This morning responding but not opening eyes, tremulous. OBJECTIVE:    Blood pressure 110/64, pulse 98, temperature 97.8 °F (36.6 °C), temperature source Oral, resp.  rate 26, height 185.4 cm (6' 1\"), weight 247 lb 9.6 oz • QUEtiapine Fumarate ER  100 mg Oral QPM   • lidocaine-menthol  1 patch Transdermal Daily   • Metoprolol Succinate ER  200 mg Oral Daily Beta Blocker   • escitalopram  10 mg Oral QPM   • Memantine HCl  5 mg Oral BID   • divalproex Sodium  250 mg Oral BI

## 2020-02-17 NOTE — PLAN OF CARE
Pt resting in bed, ambulates with assistance. Incontinent care provided. Nystatin cream applied to affected areas on groin. Wearing 3L O2 - weaning as tolerated. Unable to wean pt today due to SOB and SPO2 87% on RA. Pt is agitated and restless today.  MD morales quality of pulses, skin color and temperature  - Assess for signs of decreased coronary artery perfusion - ex.  Angina  - Evaluate fluid balance, assess for edema, trend weights  Outcome: Progressing  Goal: Absence of cardiac arrhythmias or at baseline  Dirk

## 2020-02-17 NOTE — CM/SW NOTE
Received MDO for home assessment. Per RN rounds and chart review: pt is confused and not oriented. PT recommends [facility] at d/c.    SW contacted pt's dtr - no answer, left Vmail requesting call back.     SHANI contacted Farhana nguyen/ Gerda - no

## 2020-02-18 ENCOUNTER — APPOINTMENT (OUTPATIENT)
Dept: GENERAL RADIOLOGY | Facility: HOSPITAL | Age: 80
DRG: 308 | End: 2020-02-18
Attending: INTERNAL MEDICINE
Payer: MEDICARE

## 2020-02-18 LAB
AMMONIA PLAS-MCNC: 36 UMOL/L (ref 11–32)
ANION GAP SERPL CALC-SCNC: 7 MMOL/L (ref 0–18)
BASE EXCESS BLD CALC-SCNC: 3.9 MMOL/L (ref ?–2)
BILIRUB UR QL: NEGATIVE
BUN BLD-MCNC: 20 MG/DL (ref 7–18)
BUN/CREAT SERPL: 16.5 (ref 10–20)
CALCIUM BLD-MCNC: 8.1 MG/DL (ref 8.5–10.1)
CHLORIDE SERPL-SCNC: 109 MMOL/L (ref 98–112)
CLARITY UR: CLEAR
CO2 SERPL-SCNC: 25 MMOL/L (ref 21–32)
COLOR UR: YELLOW
CREAT BLD-MCNC: 1.21 MG/DL (ref 0.7–1.3)
DEPRECATED RDW RBC AUTO: 59.8 FL (ref 35.1–46.3)
ERYTHROCYTE [DISTWIDTH] IN BLOOD BY AUTOMATED COUNT: 17 % (ref 11–15)
GLUCOSE BLD-MCNC: 84 MG/DL (ref 70–99)
GLUCOSE UR-MCNC: NEGATIVE MG/DL
HCO3 BLDA-SCNC: 27.8 MEQ/L (ref 21–27)
HCT VFR BLD AUTO: 38.9 % (ref 39–53)
HGB BLD-MCNC: 12.4 G/DL (ref 13–17.5)
HGB UR QL STRIP.AUTO: NEGATIVE
KETONES UR-MCNC: NEGATIVE MG/DL
LEUKOCYTE ESTERASE UR QL STRIP.AUTO: NEGATIVE
MCH RBC QN AUTO: 30.7 PG (ref 26–34)
MCHC RBC AUTO-ENTMCNC: 31.9 G/DL (ref 31–37)
MCV RBC AUTO: 96.3 FL (ref 80–100)
MODIFIED ALLEN TEST: POSITIVE
NITRITE UR QL STRIP.AUTO: NEGATIVE
NT-PROBNP SERPL-MCNC: 1832 PG/ML (ref ?–450)
O2 CT BLD-SCNC: 16.4 VOL% (ref 15–23)
OSMOLALITY SERPL CALC.SUM OF ELEC: 294 MOSM/KG (ref 275–295)
OXYGEN LITERS/MINUTE: 4 L/MIN
PCO2 BLDA: 38 MM HG (ref 35–45)
PH BLDA: 7.47 [PH] (ref 7.35–7.45)
PH UR: 5 [PH] (ref 5–8)
PLATELET # BLD AUTO: 88 10(3)UL (ref 150–450)
PO2 BLDA: 58 MM HG (ref 80–100)
POTASSIUM SERPL-SCNC: 4.3 MMOL/L (ref 3.5–5.1)
PROT UR-MCNC: NEGATIVE MG/DL
PUNCTURE CHARGE: YES
RBC # BLD AUTO: 4.04 X10(6)UL (ref 3.8–5.8)
SAO2 % BLDA: 94.7 % (ref 94–100)
SODIUM SERPL-SCNC: 141 MMOL/L (ref 136–145)
SP GR UR STRIP: 1.01 (ref 1–1.03)
UROBILINOGEN UR STRIP-ACNC: <2
WBC # BLD AUTO: 9.3 X10(3) UL (ref 4–11)

## 2020-02-18 PROCEDURE — 99233 SBSQ HOSP IP/OBS HIGH 50: CPT | Performed by: OTHER

## 2020-02-18 PROCEDURE — 71045 X-RAY EXAM CHEST 1 VIEW: CPT | Performed by: INTERNAL MEDICINE

## 2020-02-18 RX ORDER — METOPROLOL TARTRATE 5 MG/5ML
5 INJECTION INTRAVENOUS EVERY 6 HOURS
Status: DISCONTINUED | OUTPATIENT
Start: 2020-02-18 | End: 2020-02-20

## 2020-02-18 RX ORDER — FUROSEMIDE 10 MG/ML
20 INJECTION INTRAMUSCULAR; INTRAVENOUS ONCE
Status: COMPLETED | OUTPATIENT
Start: 2020-02-18 | End: 2020-02-18

## 2020-02-18 NOTE — PROGRESS NOTES
[facility] Hospitalist Progress Note     Reason for Admission: dementia with behavorial issues  PCP: Gurwinder Joe MD     Assessment/Plan:     Principal Problem:    Atrial fibrillation with rapid ventricular response ([address]  Active Problems:    Candidal dermatit no home meds, no need for accuchecks    #Dispo:  -needs placement in dementia unit, SW following    Checklist:   - IVF: none  - Diet: cardic  -DVT Prophy: eliquis  -Lines: PIV    Questions/concerns were discussed with patient and/or family by bedside. GFRNAA 44* 46* 48*   CA 8.5 8.1* 8.0*   * 144 139   K 4.6 4.4 4.5   * 112 106   CO2 29.0 28.0 25.0       No results for input(s): ALT, AST, ALB, AMYLASE, LIPASE, LDH in the last 168 hours.     Invalid input(s): ALPHOS, TBIL, DBIL, TPROT    Breanna

## 2020-02-18 NOTE — SLP NOTE
SLP attempt for BSE. RN reports pt not appropriate for swallow evaluation at this time. SLP to f/u as safe to participate in oral trials and as schedule permits. Thank you.     Billy [address] Pathologist   Phone Number [phone]

## 2020-02-18 NOTE — CM/SW NOTE
10: 30AM  Received MDO for home assessment. SW received call back from pt's dtr, Keena Enamorado. Keena Enamorado verified pt's address and confirmed pt lives in a 4 level home w/ his wife. Per Keena Enamorado: pt remains on the top 2 levels and has approx 10 stairs.     Pt occasional

## 2020-02-18 NOTE — PROGRESS NOTES
Assessment and Plan:       1. Atrial fibrillation with rapid ventricular response (HCC)  - chronic  - metoprolol + Eliquis  2. Dementia  3. HTN  4.  HLD        PLAN:  - same cardiac      Subjective:     No cardiac c/o    Objective:   Temp:  [97.7 °F (36.5 MD on 2/18/2020 at 9:07

## 2020-02-18 NOTE — PROGRESS NOTES
Cyrus Pandya is a 67year old male with history of HTN, A. fib, prostate cancer and DM who presented to the hospital with altered mental status and A. fib with RVR.   The patient seen today for over 35-minute, follow-up evaluation, over 50% counseling and Types: Hydrocodone      Comment: Recently detoxed       Medications (Active prior to today's visit):  haloperidol lactate (HALDOL) 5 MG/ML injection 1 mg, 1 mg, Intravenous, TID  QUEtiapine Fumarate (SEROQUEL) tab 25 mg, 25 mg, Oral, BID  QUEtiapine [place] Buff mg/hr, Intravenous, Continuous  dilTIAZem HCl (CARDIZEM) tab 60 mg, 60 mg, Oral, PRN  Rosuvastatin Calcium (CRESTOR) tab 10 mg, 10 mg, Oral, Nightly        Allergies:  No Known Allergies    Laboratory Data:  Lab Results   Component Value Date    WBC 6.1 02 3 times daily. 6.  Start Seroquel XR 1 mg in the evening. 7.  Discontinue Zyprexa 5 mg nightly. 8.  Continue Namenda to 5 mg twice daily. 9.  Discontinue Xanax 0.25 mg twice daily to minimize underlying anxiety.   10.  Utilize Haldol 0.5 mg every 2 hour

## 2020-02-18 NOTE — PHYSICAL THERAPY NOTE
Attempted physical therapy treatment. Per RN Xander Norwood, patient is not appropriate today; recommended defer until 2/19.

## 2020-02-18 NOTE — OCCUPATIONAL THERAPY NOTE
Per RN, pt is not appropriate for therapy today. Recommending treatment be deferred until tomorrow.  Will re attempt and initiate services as appropriate

## 2020-02-18 NOTE — PROGRESS NOTES
[facility] Pharmacy Note: Antimicrobial Weight Based Dose Adjustment for: piperacillin/tazobactam (Peg Deem)    Ludivina Chahal is a 67year old male who has been prescribed piperacillin/tazobactam (ZOSYN) 3.375g every 8 hours.     Estimated Creatinine Clearance: 49.5

## 2020-02-19 LAB
ANION GAP SERPL CALC-SCNC: 6 MMOL/L (ref 0–18)
BUN BLD-MCNC: 18 MG/DL (ref 7–18)
BUN/CREAT SERPL: 16.7 (ref 10–20)
CALCIUM BLD-MCNC: 7.9 MG/DL (ref 8.5–10.1)
CHLORIDE SERPL-SCNC: 108 MMOL/L (ref 98–112)
CO2 SERPL-SCNC: 28 MMOL/L (ref 21–32)
CREAT BLD-MCNC: 1.08 MG/DL (ref 0.7–1.3)
DEPRECATED RDW RBC AUTO: 60.9 FL (ref 35.1–46.3)
ERYTHROCYTE [DISTWIDTH] IN BLOOD BY AUTOMATED COUNT: 17.2 % (ref 11–15)
GLUCOSE BLD-MCNC: 92 MG/DL (ref 70–99)
HAV IGM SER QL: 2.1 MG/DL (ref 1.6–2.6)
HCT VFR BLD AUTO: 42.4 % (ref 39–53)
HGB BLD-MCNC: 13.5 G/DL (ref 13–17.5)
MCH RBC QN AUTO: 30.6 PG (ref 26–34)
MCHC RBC AUTO-ENTMCNC: 31.8 G/DL (ref 31–37)
MCV RBC AUTO: 96.1 FL (ref 80–100)
OSMOLALITY SERPL CALC.SUM OF ELEC: 296 MOSM/KG (ref 275–295)
PLATELET # BLD AUTO: 84 10(3)UL (ref 150–450)
POTASSIUM SERPL-SCNC: 4 MMOL/L (ref 3.5–5.1)
RBC # BLD AUTO: 4.41 X10(6)UL (ref 3.8–5.8)
SODIUM SERPL-SCNC: 142 MMOL/L (ref 136–145)
WBC # BLD AUTO: 5.3 X10(3) UL (ref 4–11)

## 2020-02-19 PROCEDURE — 99233 SBSQ HOSP IP/OBS HIGH 50: CPT | Performed by: OTHER

## 2020-02-19 RX ORDER — QUETIAPINE 25 MG/1
50 TABLET, FILM COATED ORAL EVERY EVENING
Status: DISCONTINUED | OUTPATIENT
Start: 2020-02-19 | End: 2020-02-24

## 2020-02-19 RX ORDER — QUETIAPINE 25 MG/1
25 TABLET, FILM COATED ORAL DAILY
Status: DISCONTINUED | OUTPATIENT
Start: 2020-02-20 | End: 2020-02-24

## 2020-02-19 NOTE — CM/SW NOTE
Received call from Zuleyma w/ [facility] - pt will require PAS screen for d/c to SNF. SHANI faxed PAS screen to: [phone]. Will need PT/OT updates once pt is appropriate for therapy. SHANI will follow up w/ pt's family for SNF choices once appropriate.     JAMES

## 2020-02-19 NOTE — PROGRESS NOTES
[facility] Hospitalist Progress Note     Reason for Admission: dementia with behavorial issues  PCP: Lexie Floyd MD     Assessment/Plan:     Principal Problem:    Atrial fibrillation with rapid ventricular response ([address]  Active Problems:    Candidal dermatit on no home meds, no need for accuchecks    #Dispo:  -needs placement in dementia unit, SW following    Checklist:   - IVF: none  - Diet: cardic  -DVT Prophy: eliquis  -Lines: PIV    Questions/concerns were discussed with patient and/or family by bedside. Recent Labs   Lab 02/17/20  0606 02/18/20  0848 02/19/20  0548   GLU 81 84 92   BUN 21* 20* 18   CREATSERUM 1.39* 1.21 1.08   GFRAA 56* 66 76   GFRNAA 48* 57* 65   CA 8.0* 8.1* 7.9*    141 142   K 4.5 4.3 4.0    109 108   CO2 25.0 25.0

## 2020-02-19 NOTE — PROGRESS NOTES
Assessment and Plan:       1. Atrial fibrillation with rapid ventricular response  - chronic  - metoprolol, diltiazem + Eliquis  2. Dementia  3. HTN  4. HLD  5.  Aspiration PNA on abx per IM    PLAN:    - On IV BB until swallow eval (today)  - same cardia and interstitial opacification in the perihilar and basilar regions has developed. Suspect multifocal pneumonitis.     Dictated by (CST): Juliet Porras MD on 2/18/2020 at 9:05     Approved by (CST): Juliet Porras MD on 2/18/2020 at 9:07

## 2020-02-19 NOTE — PROGRESS NOTES
Vlad Baig is a 67year old male with history of HTN, A. fib, prostate cancer and DM who presented to the hospital with altered mental status and A. fib with RVR.   The patient seen today for over 35-minute, follow-up evaluation, over 50% counseling and SURGERY N/A 01/2012   • REPAIR ROTATOR CUFF,ACUTE N/A 2009      Family History   Problem Relation Age of Onset   • Heart Disorder Father    • Heart Attack Mother    • Hypertension Mother       Social History: Social History    Tobacco Use      Smoking stat g, Oral, Daily PRN  magnesium hydroxide (MILK OF MAGNESIA) 400 MG/5ML suspension 30 mL, 30 mL, Oral, Daily PRN  bisacodyl (DULCOLAX) rectal suppository 10 mg, 10 mg, Rectal, Daily PRN  Fleet Enema (FLEET) 7-19 GM/118ML enema 133 mL, 1 enema, Rectal, Once P exhibiting increased agitation and confusion with hallucination and delusional ideation. Cognition impaired. 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   Episodic mood d

## 2020-02-19 NOTE — PROGRESS NOTES
Miguelangel Ferreira is a 67year old male with history of HTN, A. fib, prostate cancer and DM who presented to the hospital with altered mental status and A. fib with RVR.   The patient seen today for over 35-minute, follow-up evaluation, over 50% counseling and History   Problem Relation Age of Onset   • Heart Disorder Father    • Heart Attack Mother    • Hypertension Mother       Social History: Social History    Tobacco Use      Smoking status: Former Smoker        Packs/day: 1.00        Years: 25.00        Pac suspension 30 mL, 30 mL, Oral, Daily PRN  bisacodyl (DULCOLAX) rectal suppository 10 mg, 10 mg, Rectal, Daily PRN  Fleet Enema (FLEET) 7-19 GM/118ML enema 133 mL, 1 enema, Rectal, Once PRN  ondansetron HCl (ZOFRAN) injection 4 mg, 4 mg, Intravenous, Q6H DE delusional ideation. Cognition impaired. 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   Episodic mood disorder        The patient with history of mood disorder and dementi

## 2020-02-19 NOTE — PLAN OF CARE
Pt resting in bed, repositioning frequently. Wearing 4L O2, wean as able. Pt sedated today and unable to swallow medications.  MD paged this morning because patient had fever    Problem: Patient Centered Care  Goal: Patient preferences are identified and in hemodynamic stability  - Monitor arterial and/or venous puncture sites for bleeding and/or hematoma  - Assess quality of pulses, skin color and temperature  - Assess for signs of decreased coronary artery perfusion - ex.  Angina  - Evaluate fluid balance, a

## 2020-02-19 NOTE — CONSULTS
Pulmonary Consult     Assessment / Plan:  1. Hypoxia - due to pneumonia. Likely has a component of pulm edema as well  - abx as below  - diuresis prn  - wean supplemental oxygen as able  2. Aspiration pneumonia  - zosyn  - dysphagia diet  3.  Dementia  - peraza bedtime  raNITIdine HCl 150 MG Oral Cap, Take 150 mg by mouth every evening., Disp: , Rfl: , 2/14/2020 at pm  Metoprolol Succinate  MG Oral Tablet 24 Hr, Take 100 mg by mouth daily. , Disp: , Rfl: , 2/15/2020 at am  cyanocobalamin 1000 MCG Oral Tab, T (two) times daily. , Disp: , Rfl:   divalproex Sodium 250 MG Oral Tab EC DR tab, Take 1 tablet (250 mg total) by mouth 2 (two) times daily. , Disp: 180 tablet, Rfl: 2  AmLODIPine Besylate 10 MG Oral Tab, Take 10 mg by mouth daily. , Disp: , Rfl:   TraZODone H

## 2020-02-20 LAB
ANION GAP SERPL CALC-SCNC: 7 MMOL/L (ref 0–18)
BUN BLD-MCNC: 21 MG/DL (ref 7–18)
BUN/CREAT SERPL: 19.3 (ref 10–20)
CALCIUM BLD-MCNC: 8 MG/DL (ref 8.5–10.1)
CHLORIDE SERPL-SCNC: 111 MMOL/L (ref 98–112)
CO2 SERPL-SCNC: 26 MMOL/L (ref 21–32)
CREAT BLD-MCNC: 1.09 MG/DL (ref 0.7–1.3)
DEPRECATED RDW RBC AUTO: 61.8 FL (ref 35.1–46.3)
ERYTHROCYTE [DISTWIDTH] IN BLOOD BY AUTOMATED COUNT: 17.2 % (ref 11–15)
GLUCOSE BLD-MCNC: 105 MG/DL (ref 70–99)
HAV IGM SER QL: 2.3 MG/DL (ref 1.6–2.6)
HCT VFR BLD AUTO: 41.5 % (ref 39–53)
HGB BLD-MCNC: 12.9 G/DL (ref 13–17.5)
MCH RBC QN AUTO: 30.5 PG (ref 26–34)
MCHC RBC AUTO-ENTMCNC: 31.1 G/DL (ref 31–37)
MCV RBC AUTO: 98.1 FL (ref 80–100)
OSMOLALITY SERPL CALC.SUM OF ELEC: 301 MOSM/KG (ref 275–295)
PLATELET # BLD AUTO: 93 10(3)UL (ref 150–450)
POTASSIUM SERPL-SCNC: 4.1 MMOL/L (ref 3.5–5.1)
RBC # BLD AUTO: 4.23 X10(6)UL (ref 3.8–5.8)
SODIUM SERPL-SCNC: 144 MMOL/L (ref 136–145)
WBC # BLD AUTO: 4.8 X10(3) UL (ref 4–11)

## 2020-02-20 PROCEDURE — 99233 SBSQ HOSP IP/OBS HIGH 50: CPT | Performed by: OTHER

## 2020-02-20 RX ORDER — FUROSEMIDE 10 MG/ML
20 INJECTION INTRAMUSCULAR; INTRAVENOUS ONCE
Status: COMPLETED | OUTPATIENT
Start: 2020-02-20 | End: 2020-02-20

## 2020-02-20 RX ORDER — VALPROIC ACID 250 MG/5ML
500 SOLUTION ORAL 2 TIMES DAILY
Status: DISCONTINUED | OUTPATIENT
Start: 2020-02-20 | End: 2020-02-24

## 2020-02-20 NOTE — SLP NOTE
SPEECH DAILY NOTE - INPATIENT    ASSESSMENT & PLAN   ASSESSMENT  Pt seen for swallowing therapy to monitor swallowing tolerance and train swallowing precautions per BSE and trial possible diet upgrade.   Collaborated with RN, whom reports pt is tolerating d needed if CXR declines. Collaborated recommendations with RN and RN to obtain new orders. Diet Recommendations - Solids: Mechanical soft chopped  Diet Recommendations - Liquid: Thin(No Straw)    Compensatory Strategies Recommended: Slow rate; No straws; diet to chopped and thin liquids/no straw. Continue to upgrade diet as tolerated.   In Progress   Goal #4 The patient will utilize compensatory strategies as outlined by  BSSE (clinical evaluation) including Slow rate, Small bites, Small sips, Multiple swa

## 2020-02-20 NOTE — PROGRESS NOTES
[facility] Hospitalist Progress Note     Reason for Admission: dementia with behavorial issues  PCP: Sabra Srinivasan MD     Assessment/Plan:     Principal Problem:    Atrial fibrillation with rapid ventricular response ([address]  Active Problems:    Candidal dermatit Candida:  -miconazole cream     #Hx of DM:  -last A1C 5.7% on no home meds, no need for accuchecks    #Dispo:  -needs placement in dementia unit,  following    Checklist:   - IVF: none  - Diet: cardic  -DVT Prophy: eliquis  -Lines: PIV    Discussed with 17.2* 17.2*   NEPRELIM 4.71  --   --   --   --    WBC 6.4   < > 9.3 5.3 4.8   .0*   < > 88.0* 84.0* 93.0*    < > = values in this interval not displayed.          Recent Labs   Lab 02/18/20  0848 02/19/20  0548 02/20/20  0504   GLU 84 92 105*   BUN 2

## 2020-02-20 NOTE — PLAN OF CARE
Patient more alert this evening. Pills given crushed in apple sauce. Puree/Nectar thick diet maintained. 3L O2 in place. Incontinence care provided. Zosyn. Cardizem tablet administered for HR sustaining 120's, tolerated well. No pain noted.  Frequent roundi Monitor for bleeding, hypotension and signs of decreased cardiac output  - Evaluate effectiveness of vasoactive medications to optimize hemodynamic stability  - Monitor arterial and/or venous puncture sites for bleeding and/or hematoma  - Assess quality of assistance  - Assess pain using appropriate pain scale  - Administer analgesics based on type and severity of pain and evaluate response  - Implement non-pharmacological measures as appropriate and evaluate response  - Consider cultural and social influenc as appropriate  - Assess patient's ability to be responsible for managing their own health  - Refer to Case Management Department for coordinating discharge planning if the patient needs post-hospital services based on physician/LIP order or complex needs indicated  2/20/2020 0146 by Shelley Rutherford RN  Outcome: Progressing  2/20/2020 0118 by Shelley Rutherford RN  Outcome: Progressing  Goal: Oral mucous membranes remain intact  Description  INTERVENTIONS  - Assess oral mucosa and hygiene practices  - Tanya

## 2020-02-20 NOTE — PROGRESS NOTES
Kirsty Garcia made introductory visit. Patient was alert in room  O- Patient spoke of his sadness and fears. Patient spoke of his physical pain (back) and of   wanting to hear from his wife.  Patient expressed feeling a lack of hope and purpose in his life  A

## 2020-02-20 NOTE — PROGRESS NOTES
Drea Granda is a 67year old male with history of HTN, A. fib, prostate cancer and DM who presented to the hospital with altered mental status and A. fib with RVR.   The patient seen today for over 35-minute, follow-up evaluation, over 50% counseling and osteoarthritis involving multiple joints 9/3/2015   • Sleep apnea       Past Surgical History:   Procedure Laterality Date   • KNEE REPLACEMENT SURGERY N/A 01/2012   • REPAIR ROTATOR CUFF,ACUTE N/A 2009      Family History   Problem Relation Age of Onset BID  PEG 3350 (MIRALAX) powder packet 17 g, 17 g, Oral, Daily PRN  magnesium hydroxide (MILK OF MAGNESIA) 400 MG/5ML suspension 30 mL, 30 mL, Oral, Daily PRN  bisacodyl (DULCOLAX) rectal suppository 10 mg, 10 mg, Rectal, Daily PRN  Fleet Enema (FLEET) 7-19 condition good  The patient reported that he is anxious and he want to go home. Patient exhibited irritable and restless affect. Patient denied any suicidal or homicidal ideation. Patient denied any auditory or visual hallucination.   Patient continue ex ordered in this encounter         2/20/2020  Myrtle Winter MD

## 2020-02-20 NOTE — PROGRESS NOTES
Pulmonary Progress Note     Assessment / Plan:  1. Hypoxia - due to pneumonia  - abx as below  - wean supplemental oxygen as able  2. Aspiration pneumonia  - zosyn  - dysphagia diet  3. Dementia  - supportive care  4.  Dispo  - dc planning      Subjective:

## 2020-02-20 NOTE — PLAN OF CARE
Patient alert throughout the day, oriented to self and place at times. On 2 L of oxygen. He has been eating full meals today. Diet changed to thin liquids (no straw) and chopped diet. Supervision during meals recommended.  Patient raised fully up in bed for hypotension and signs of decreased cardiac output  - Evaluate effectiveness of vasoactive medications to optimize hemodynamic stability  - Monitor arterial and/or venous puncture sites for bleeding and/or hematoma  - Assess quality of pulses, skin color an Utilize distraction and/or relaxation techniques  - Monitor for opioid side effects  - Notify MD/LIP if interventions unsuccessful or patient reports new pain  - Anticipate increased pain with activity and pre-medicate as appropriate  Outcome: Progressing consumed  - Identify factors contributing to decreased intake, treat as appropriate  - Assist with meals as needed  - Monitor I&O, WT and lab values  - Obtain nutritional consult as needed  - Optimize oral hygiene and moisture  - Encourage food from home; clearing or wet/gurgly vocal quality is noted  Outcome: Progressing

## 2020-02-20 NOTE — PLAN OF CARE
Patient drowsy throughout the day, sleeping most of the time. Was able to eat a little this afternoon and swallow medicine that's crushed, but was unable to follow directions and swallow whole pills later in the afternoon.  Scheduled IV haldol discontinued and hemodynamic stability  Description  INTERVENTIONS:  - Monitor vital signs, rhythm, and trends  - Monitor for bleeding, hypotension and signs of decreased cardiac output  - Evaluate effectiveness of vasoactive medications to optimize hemodynamic stabili evaluate response  - Consider cultural and social influences on pain and pain management  - Manage/alleviate anxiety  - Utilize distraction and/or relaxation techniques  - Monitor for opioid side effects  - Notify MD/LIP if interventions unsuccessful or pa Maintains adequate nutritional intake (undernourished)  Description  INTERVENTIONS:  - Monitor percentage of each meal consumed  - Identify factors contributing to decreased intake, treat as appropriate  - Assist with meals as needed  - Monitor I&O, WT and during mobilization  - Obtain PT/OT consults as needed  - Advance activity as appropriate  - Communicate ordered activity level and limitations with patient/family  2/19/2020 1938 by Isiah Zhou  Outcome: Not Progressing

## 2020-02-20 NOTE — PROGRESS NOTES
Assessment and Plan:       1. Atrial fibrillation with rapid ventricular response  - chronic  - metoprolol, diltiazem + Eliquis  2. Dementia  3. HTN, controlled  4. HLD, on statin  5.  Aspiration PNA on abx per IM    PLAN:    - back on oral medications  - by (CST): Gianluca Briones MD on 2/18/2020 at 9:05     Approved by (CST): Gianluca Briones MD on 2/18/2020 at 9:07

## 2020-02-21 LAB
ANION GAP SERPL CALC-SCNC: 6 MMOL/L (ref 0–18)
BUN BLD-MCNC: 17 MG/DL (ref 7–18)
BUN/CREAT SERPL: 15.6 (ref 10–20)
CALCIUM BLD-MCNC: 7.5 MG/DL (ref 8.5–10.1)
CHLORIDE SERPL-SCNC: 110 MMOL/L (ref 98–112)
CO2 SERPL-SCNC: 28 MMOL/L (ref 21–32)
CREAT BLD-MCNC: 1.09 MG/DL (ref 0.7–1.3)
DEPRECATED RDW RBC AUTO: 58.9 FL (ref 35.1–46.3)
ERYTHROCYTE [DISTWIDTH] IN BLOOD BY AUTOMATED COUNT: 16.8 % (ref 11–15)
GLUCOSE BLD-MCNC: 89 MG/DL (ref 70–99)
HAV IGM SER QL: 2.2 MG/DL (ref 1.6–2.6)
HCT VFR BLD AUTO: 40.8 % (ref 39–53)
HGB BLD-MCNC: 13.1 G/DL (ref 13–17.5)
MCH RBC QN AUTO: 30.6 PG (ref 26–34)
MCHC RBC AUTO-ENTMCNC: 32.1 G/DL (ref 31–37)
MCV RBC AUTO: 95.3 FL (ref 80–100)
OSMOLALITY SERPL CALC.SUM OF ELEC: 299 MOSM/KG (ref 275–295)
PLATELET # BLD AUTO: 106 10(3)UL (ref 150–450)
POTASSIUM SERPL-SCNC: 4.2 MMOL/L (ref 3.5–5.1)
RBC # BLD AUTO: 4.28 X10(6)UL (ref 3.8–5.8)
SODIUM SERPL-SCNC: 144 MMOL/L (ref 136–145)
WBC # BLD AUTO: 4.8 X10(3) UL (ref 4–11)

## 2020-02-21 RX ORDER — TRAMADOL HYDROCHLORIDE 50 MG/1
50 TABLET ORAL EVERY 8 HOURS PRN
Status: DISCONTINUED | OUTPATIENT
Start: 2020-02-21 | End: 2020-02-24

## 2020-02-21 RX ORDER — TRAMADOL HYDROCHLORIDE 50 MG/1
100 TABLET ORAL EVERY 8 HOURS PRN
Status: DISCONTINUED | OUTPATIENT
Start: 2020-02-21 | End: 2020-02-24

## 2020-02-21 NOTE — PROGRESS NOTES
Assessment and Plan:       Admitted with behavioral issues. IMPRESSIONS:  1. Atrial fibrillation with rapid ventricular response, now better  - chronic  - metoprolol, diltiazem + Eliquis  2. Dementia, with behavioral issues  3. HTN, controlled  4.  HLD interstitial opacification in the perihilar and basilar regions has developed. Suspect multifocal pneumonitis.

## 2020-02-21 NOTE — PLAN OF CARE
Problem: PAIN - ADULT  Goal: Verbalizes/displays adequate comfort level or patient's stated pain goal  Description  INTERVENTIONS:  - Encourage pt to monitor pain and request assistance  - Assess pain using appropriate pain scale  - Administer analgesics patient's ability to be responsible for managing their own health  - Refer to Case Management Department for coordinating discharge planning if the patient needs post-hospital services based on physician/LIP order or complex needs related to functional sta risk  Description  Interventions:  - Patient should be alert and upright for all feedings (90 degrees preferred)  - Offer food and liquids at a slow rate  - No straws  - Encourage small bites of food and small sips of liquid  - Offer pills one at a time, c

## 2020-02-21 NOTE — PROGRESS NOTES
[facility] Hospitalist Progress Note     Reason for Admission: dementia with behavorial issues  PCP: Philipp Joseph MD     Assessment/Plan:     Principal Problem:    Atrial fibrillation with rapid ventricular response ([address]  Active Problems:    Candidal dermatit continue eliquis     #Inguinal Candida:  -miconazole cream     #Hx of DM:  -last A1C 5.7% on no home meds, no need for accuchecks    #Dispo:  -needs placement in dementia unit, SW following    Checklist:   - IVF: none  - Diet: cardic  -DVT Prophy: eliquis < > 42.4 41.5 40.8   MCV 97.7   < > 96.1 98.1 95.3   MCH 30.6   < > 30.6 30.5 30.6   MCHC 31.3   < > 31.8 31.1 32.1   RDW 17.0*   < > 17.2* 17.2* 16.8*   NEPRELIM 4.71  --   --   --   --    WBC 6.4   < > 5.3 4.8 4.8   .0*   < > 84.0* 93.0* 106.0*

## 2020-02-21 NOTE — DISCHARGE SUMMARY
[facility] Internal Medicine Discharge Summary   Patient ID:  Chano Clark  [id number]  81 year old  8/27/1940    Admit date: 2/15/2020    Discharge date and time: 2/23/20    Attending Physician: Antonella Dangelo MD     Primary Care Physician: Danilo Rosas Take 1 tablet (50 mg total) by mouth every evening. * QUEtiapine Fumarate 25 MG Tabs  Commonly known as:  SEROQUEL  Take 1 tablet (25 mg total) by mouth daily.   Start taking on:  February 24, 2020     traMADol HCl 50 MG Tabs  Commonly known as:  Kelsey Perish Commonly known as:  DESYREL           Where to Get Your Medications      You can get these medications from any pharmacy    Bring a paper prescription for each of these medications  · acetaminophen 325 MG Tabs  · Amoxicillin-Pot Clavulanate 875-125 MG Tabs -IV zosyn in house, d.c on po augmentin to complete 7 day course.   Seen by pulm in house  -wean O2 as able  - speech rec nectar thick, cont to work with speech at facility      #Dementia with Behavior Disturbances: improved  -[facility] PROCEDURE: XR CHEST AP PORTABLE (CPT=71010) TIME: 0831 hours  COMPARISON: [facility], XR CHEST PA + LAT CHEST (DSS=[id number]), 4/30/2018, 12:41. INDICATIONS: Fever, congestion  TECHNIQUE:   Single view. FINDINGS:  CARDIAC/VASC: Cardiomegaly.

## 2020-02-21 NOTE — CM/SW NOTE
12: 55PM  SW met w/ pt's family in pt's room. They informed SW of wish for pt to d/c to [facility]. They also informed SW that they have been in touch w/ Henrry. SHANI spoke w/ Patrick at [facility] ([phone]).  Per her request: SHANI faxed pt info to: [phone]

## 2020-02-21 NOTE — PROGRESS NOTES
Pulmonary Progress Note     Assessment / Plan:  1. Hypoxia - due to pneumonia  - abx as below  - wean supplemental oxygen as able  2. Aspiration pneumonia  - zosyn; can dc on augmentin to complete seven days of therapy  - dysphagia diet  3.  Dementia  - sup

## 2020-02-21 NOTE — OCCUPATIONAL THERAPY NOTE
OCCUPATIONAL THERAPY EVALUATION - INPATIENT     Room Number: 308/308-A  Evaluation Date: 2/21/2020  Type of Evaluation: Initial  Presenting Problem: mental status change, atrial fib with RVR, candidal dermatitis    Physician Order: IP Consult to Occupation training;Functional transfer training; Endurance training;UE strengthening/ROM; Patient/Family education; Compensatory technique education       OCCUPATIONAL THERAPY MEDICAL/SOCIAL HISTORY     Problem List  Principal Problem:    Atrial fibrillation with rapid Techniques:  Activity promotion;Repositioning    ACTIVITY TOLERANCE  Pulse: (!) 125  Heart Rate Source: Monitor          COGNITION  Overall Cognitive Status:  Impaired  Arousal/Alertness:  appropriate responses to stimuli  Orientation Level:  oriented to pl light within reach;RN aware of session/findings; Alarm set; All patient questions and concerns addressed    OT Goals  Patients self stated goal is: None stated.       Patient will complete functional transfer with SBA  Comment:     Patient will complete toile

## 2020-02-21 NOTE — PLAN OF CARE
Plan for discharge soon pending placement. Family looking into memory care facility. IV abx while in the hospital for pneumonia. Pt ambulating in room with assistance; can be impulsive at times. Fall precautions in place.  Pt requiring 2 L o2 at this time; replacement therapy as ordered  Outcome: Progressing     Problem: SAFETY ADULT - FALL  Goal: Free from fall injury  Description  INTERVENTIONS:  - Assess pt frequently for physical needs  - Identify cognitive and physical deficits and behaviors that affect

## 2020-02-21 NOTE — PHYSICAL THERAPY NOTE
PHYSICAL THERAPY TREATMENT NOTE - INPATIENT     Room Number: 525/100-E       Presenting Problem: admitted for progressive mental status change, atrial fib with RVR, candidal dermatitis    Problem List  Principal Problem:    Atrial fibrillation with rapid v training;Transfer training;Balance training    SUBJECTIVE  \"My shoulders are sore\".     OBJECTIVE  Precautions: Bed/chair alarm    WEIGHT BEARING RESTRICTION  Weight Bearing Restriction: None       PAIN ASSESSMENT   Ratin  Location: shoulder soreness following commands      THERAPEUTIC EXERCISES  Lower Extremity Alternating marching  Ankle pumps     Position Sitting & Standing       Patient End of Session: Up in chair;Needs met;Call light within reach;RN aware of session/findings; All patient questions

## 2020-02-22 RX ORDER — DILTIAZEM HYDROCHLORIDE 180 MG/1
360 CAPSULE, EXTENDED RELEASE ORAL DAILY
Status: DISCONTINUED | OUTPATIENT
Start: 2020-02-23 | End: 2020-02-24

## 2020-02-22 NOTE — PROGRESS NOTES
[facility] Hospitalist Progress Note     Reason for Admission: dementia with behavorial issues  PCP: Yadira Ruiz MD     Assessment/Plan:     Principal Problem:    Atrial fibrillation with rapid ventricular response ([address]  Active Problems:    Candidal dermatit eliquis     #Inguinal Candida:  -miconazole cream     #Hx of DM:  -last A1C 5.7% on no home meds, no need for accuchecks    #Dispo:  -needs placement in dementia unit, SW following    Checklist:   - IVF: none  - Diet: cardic  -DVT Prophy: eliquis  -Lines: HCT 42.8   < > 42.4 41.5 40.8   MCV 97.7   < > 96.1 98.1 95.3   MCH 30.6   < > 30.6 30.5 30.6   MCHC 31.3   < > 31.8 31.1 32.1   RDW 17.0*   < > 17.2* 17.2* 16.8*   NEPRELIM 4.71  --   --   --   --    WBC 6.4   < > 5.3 4.8 4.8   .0*   < > 84.0* 93 - History of PCKD s/p Nephrectomy and 3x Kidney Transplant  - Nephrology: Continue with Scheduled HD on M/W/F  - Continue with Phosphate Binders, and Sensipar

## 2020-02-22 NOTE — PLAN OF CARE
Problem: Patient Centered Care  Goal: Patient preferences are identified and integrated in the patient's plan of care  Description  Interventions:  - What would you like us to know as we care for you?  I live at home with my wife  - Provide timely, comple response  - Implement non-pharmacological measures as appropriate and evaluate response  - Consider cultural and social influences on pain and pain management  - Manage/alleviate anxiety  - Utilize distraction and/or relaxation techniques  - Monitor for op oral medicated treatments as ordered  Outcome: Progressing     Problem: MUSCULOSKELETAL - ADULT  Goal: Return mobility to safest level of function  Description  INTERVENTIONS:  - Assess patient stability and activity tolerance for standing, transferring an

## 2020-02-22 NOTE — PLAN OF CARE
Pain medication given as needed for chronic back pain. Family currently at the bedside. Plan is to DC tomorrow to [facility]. Form in the chart is signed and will be faxed over via . Will continue to monitor.      Problem: Patient Centered Care  Goal: Patient artery perfusion - ex.  Angina  - Evaluate fluid balance, assess for edema, trend weights  Outcome: Progressing  Goal: Absence of cardiac arrhythmias or at baseline  Description  INTERVENTIONS:  - Continuous cardiac monitoring, monitor vital signs, obtain 1 injury  Description  INTERVENTIONS:  - Assess pt frequently for physical needs  - Identify cognitive and physical deficits and behaviors that affect risk of falls.   - [place] fall precautions as indicated by assessment.  - Educate pt/family on patient sa consult as needed  - Optimize oral hygiene and moisture  - Encourage food from home; allow for food preferences  - Enhance eating environment  Outcome: Progressing     Problem: SKIN/TISSUE INTEGRITY - ADULT  Goal: Skin integrity remains intact  Description degrees preferred)  - Offer food and liquids at a slow rate  - No straws  - Encourage small bites of food and small sips of liquid  - Offer pills one at a time, crush or deliver with applesauce as needed  - Discontinue feeding and notify MD (or speech path

## 2020-02-22 NOTE — PROGRESS NOTES
ADDENDUM:  The patient was personally seen and examined by me. I agree with the note written below with the following comments:    S: sleeping, but arousable. good spirits. no significant complaints.     O: /80 (BP Location: Right arm)   Pulse 116 --    CREATSERUM 1.08 1.09 1.09  --    GFRAA 76 75 75  --    GFRNAA 65 65 65  --    CA 7.9* 8.0* 7.5*  --     144 144  --    K 4.0 4.1  --  4.2    111 110  --    CO2 28.0 26.0 28.0  --      No results for input(s): TROP, CK in the last 168 hour

## 2020-02-23 LAB
ANION GAP SERPL CALC-SCNC: 3 MMOL/L (ref 0–18)
BASOPHILS # BLD AUTO: 0.05 X10(3) UL (ref 0–0.2)
BASOPHILS NFR BLD AUTO: 0.7 %
BUN BLD-MCNC: 16 MG/DL (ref 7–18)
BUN/CREAT SERPL: 14 (ref 10–20)
CALCIUM BLD-MCNC: 8.2 MG/DL (ref 8.5–10.1)
CHLORIDE SERPL-SCNC: 112 MMOL/L (ref 98–112)
CO2 SERPL-SCNC: 30 MMOL/L (ref 21–32)
CREAT BLD-MCNC: 1.14 MG/DL (ref 0.7–1.3)
DEPRECATED RDW RBC AUTO: 59.5 FL (ref 35.1–46.3)
EOSINOPHIL # BLD AUTO: 0.19 X10(3) UL (ref 0–0.7)
EOSINOPHIL NFR BLD AUTO: 2.6 %
ERYTHROCYTE [DISTWIDTH] IN BLOOD BY AUTOMATED COUNT: 16.8 % (ref 11–15)
GLUCOSE BLD-MCNC: 101 MG/DL (ref 70–99)
HCT VFR BLD AUTO: 45.1 % (ref 39–53)
HGB BLD-MCNC: 14.4 G/DL (ref 13–17.5)
IMM GRANULOCYTES # BLD AUTO: 0.04 X10(3) UL (ref 0–1)
IMM GRANULOCYTES NFR BLD: 0.6 %
LYMPHOCYTES # BLD AUTO: 0.97 X10(3) UL (ref 1–4)
LYMPHOCYTES NFR BLD AUTO: 13.5 %
MCH RBC QN AUTO: 30.5 PG (ref 26–34)
MCHC RBC AUTO-ENTMCNC: 31.9 G/DL (ref 31–37)
MCV RBC AUTO: 95.6 FL (ref 80–100)
MONOCYTES # BLD AUTO: 0.71 X10(3) UL (ref 0.1–1)
MONOCYTES NFR BLD AUTO: 9.9 %
NEUTROPHILS # BLD AUTO: 5.23 X10 (3) UL (ref 1.5–7.7)
NEUTROPHILS # BLD AUTO: 5.23 X10(3) UL (ref 1.5–7.7)
NEUTROPHILS NFR BLD AUTO: 72.7 %
OSMOLALITY SERPL CALC.SUM OF ELEC: 301 MOSM/KG (ref 275–295)
PLATELET # BLD AUTO: 125 10(3)UL (ref 150–450)
POTASSIUM SERPL-SCNC: 4.8 MMOL/L (ref 3.5–5.1)
RBC # BLD AUTO: 4.72 X10(6)UL (ref 3.8–5.8)
SODIUM SERPL-SCNC: 145 MMOL/L (ref 136–145)
WBC # BLD AUTO: 7.2 X10(3) UL (ref 4–11)

## 2020-02-23 RX ORDER — TRAMADOL HYDROCHLORIDE 50 MG/1
100 TABLET ORAL EVERY 8 HOURS PRN
Qty: 30 TABLET | Refills: 0 | Status: ON HOLD | OUTPATIENT
Start: 2020-02-23 | End: 2020-03-10

## 2020-02-23 RX ORDER — QUETIAPINE 25 MG/1
25 TABLET, FILM COATED ORAL DAILY
Qty: 30 TABLET | Refills: 0 | Status: SHIPPED | OUTPATIENT
Start: 2020-02-24

## 2020-02-23 RX ORDER — IPRATROPIUM BROMIDE AND ALBUTEROL SULFATE 2.5; .5 MG/3ML; MG/3ML
3 SOLUTION RESPIRATORY (INHALATION) EVERY 4 HOURS PRN
Qty: 10 VIAL | Refills: 0 | Status: SHIPPED | OUTPATIENT
Start: 2020-02-23

## 2020-02-23 RX ORDER — VALPROIC ACID 250 MG/5ML
500 SOLUTION ORAL 2 TIMES DAILY
Qty: 600 ML | Refills: 0 | Status: ON HOLD | OUTPATIENT
Start: 2020-02-23 | End: 2020-03-10

## 2020-02-23 RX ORDER — NYSTATIN 100000 U/G
1 CREAM TOPICAL 2 TIMES DAILY
Qty: 1 TUBE | Refills: 0 | Status: SHIPPED | OUTPATIENT
Start: 2020-02-23

## 2020-02-23 RX ORDER — POLYETHYLENE GLYCOL 3350 17 G/17G
17 POWDER, FOR SOLUTION ORAL DAILY PRN
Qty: 30 EACH | Refills: 0 | Status: SHIPPED | OUTPATIENT
Start: 2020-02-23

## 2020-02-23 RX ORDER — PANTOPRAZOLE SODIUM 40 MG/1
40 TABLET, DELAYED RELEASE ORAL
Qty: 30 TABLET | Refills: 0 | Status: SHIPPED | OUTPATIENT
Start: 2020-02-24

## 2020-02-23 RX ORDER — QUETIAPINE 50 MG/1
50 TABLET, FILM COATED ORAL EVERY EVENING
Qty: 30 TABLET | Refills: 0 | Status: SHIPPED | OUTPATIENT
Start: 2020-02-23

## 2020-02-23 RX ORDER — PSEUDOEPHEDRINE HCL 30 MG
100 TABLET ORAL 2 TIMES DAILY
Qty: 60 CAPSULE | Refills: 0 | Status: SHIPPED | OUTPATIENT
Start: 2020-02-23

## 2020-02-23 RX ORDER — DILTIAZEM HYDROCHLORIDE 360 MG/1
360 CAPSULE, EXTENDED RELEASE ORAL DAILY
Qty: 30 CAPSULE | Refills: 0 | Status: SHIPPED | OUTPATIENT
Start: 2020-02-24 | End: 2020-05-28

## 2020-02-23 RX ORDER — METOPROLOL SUCCINATE 200 MG/1
200 TABLET, EXTENDED RELEASE ORAL
Qty: 30 TABLET | Refills: 0 | Status: SHIPPED | OUTPATIENT
Start: 2020-02-24

## 2020-02-23 RX ORDER — BISACODYL 10 MG
10 SUPPOSITORY, RECTAL RECTAL
Qty: 30 SUPPOSITORY | Refills: 0 | Status: SHIPPED | OUTPATIENT
Start: 2020-02-23 | End: 2020-05-28

## 2020-02-23 RX ORDER — ACETAMINOPHEN 325 MG/1
650 TABLET ORAL EVERY 6 HOURS PRN
Qty: 30 TABLET | Refills: 0 | Status: SHIPPED | OUTPATIENT
Start: 2020-02-23 | End: 2020-05-28

## 2020-02-23 RX ORDER — AMOXICILLIN AND CLAVULANATE POTASSIUM 875; 125 MG/1; MG/1
1 TABLET, FILM COATED ORAL 2 TIMES DAILY
Qty: 3 TABLET | Refills: 0 | Status: SHIPPED | OUTPATIENT
Start: 2020-02-23 | End: 2020-02-25

## 2020-02-23 NOTE — PROGRESS NOTES
ADDENDUM:  The patient was personally seen and examined by me. I agree with the note written below with the following comments:    S: trouble sleeping last night, due to back pain.  +chronic dyspnea, unchanged.     O: BP 95/55 (BP Location: Left arm)   Puls Tele: AF, HR 80s, to 130s at times with agitation/activity    Recent Labs   Lab 02/20/20  0504 02/21/20  0734 02/21/20  0847 02/23/20  0707   * 89  --  101*   BUN 21* 17  --  16   CREATSERUM 1.09 1.09  --  1.14   GFRAA 75 75  --  71   GFRNAA 65

## 2020-02-23 NOTE — PLAN OF CARE
Pain medication given as needed. IV antibiotics given. 85% RA at rest. Overnight oxygen study to be done by RT because the patient is unable to walk. HME form will need signed by MD tomorrow.  Call SW with HME form as soon as possible to get a tank delivere pulses, skin color and temperature  - Assess for signs of decreased coronary artery perfusion - ex.  Angina  - Evaluate fluid balance, assess for edema, trend weights  Outcome: Progressing  Goal: Absence of cardiac arrhythmias or at baseline  Description  I appropriate  Outcome: Progressing     Problem: SAFETY ADULT - FALL  Goal: Free from fall injury  Description  INTERVENTIONS:  - Assess pt frequently for physical needs  - Identify cognitive and physical deficits and behaviors that affect risk of falls.   - appropriate  - Assist with meals as needed  - Monitor I&O, WT and lab values  - Obtain nutritional consult as needed  - Optimize oral hygiene and moisture  - Encourage food from home; allow for food preferences  - Enhance eating environment  Outcome: Progr risk  Description  Interventions:  - Patient should be alert and upright for all feedings (90 degrees preferred)  - Offer food and liquids at a slow rate  - No straws  - Encourage small bites of food and small sips of liquid  - Offer pills one at a time, c

## 2020-02-23 NOTE — PROGRESS NOTES
[facility] Hospitalist Progress Note     Reason for Admission: dementia with behavorial issues  PCP: Juliette Cormier MD     Assessment/Plan:     Principal Problem:    Atrial fibrillation with rapid ventricular response ([address]  Active Problems:    Candidal dermatit cirrhosis   -ok to continue eliquis     #Inguinal Candida:  -miconazole cream     #Hx of DM:  -last A1C 5.7% on no home meds, no need for accuchecks    #Dispo:  -needs placement in dementia unit,  following    Checklist:   - IVF: none  - Diet: cardic  -D 02/23/20  0707   RBC 4.23 4.28 4.72   HGB 12.9* 13.1 14.4   HCT 41.5 40.8 45.1   MCV 98.1 95.3 95.6   MCH 30.5 30.6 30.5   MCHC 31.1 32.1 31.9   RDW 17.2* 16.8* 16.8*   NEPRELIM  --   --  5.23   WBC 4.8 4.8 7.2   PLT 93.0* 106.0* 125.0*         Recent Labs

## 2020-02-23 NOTE — PROGRESS NOTES
Plan is to DC to [facility] because the facility accepted the patient without letting us know they didn't have his medications available or oxygen tanks.  Due to it being unsafe for the patient, we canceled DC for today and will reschedule transport for

## 2020-02-23 NOTE — CM/SW NOTE
1357: SW spoke with Patrick and confirmed they do not have O2 at facility and will need to be set up as well as patient will need to bring meds and after admission they will arrange with their pharmacy.  SHANI spoke with RN and confirmed patient's O2 is new thi

## 2020-02-23 NOTE — PLAN OF CARE
Patient alert and oriented x 2. 2L O2 via nasal cannula. VSS. Iv zosyn. Tramadol for pain. Plan to discharge to [facility] today. Will continue to monitor.     Problem: Patient Centered Care  Goal: Patient preferences are identified and integrated in fluid balance, assess for edema, trend weights  Outcome: Progressing  Goal: Absence of cardiac arrhythmias or at baseline  Description  INTERVENTIONS:  - Continuous cardiac monitoring, monitor vital signs, obtain 12 lead EKG if indicated  - Evaluate effect frequently for physical needs  - Identify cognitive and physical deficits and behaviors that affect risk of falls.   - [place] fall precautions as indicated by assessment.  - Educate pt/family on patient safety including physical limitations  - Instruct p moisture  - Encourage food from home; allow for food preferences  - Enhance eating environment  Outcome: Progressing     Problem: SKIN/TISSUE INTEGRITY - ADULT  Goal: Skin integrity remains intact  Description  INTERVENTIONS  - Assess and document risk fac slow rate  - No straws  - Encourage small bites of food and small sips of liquid  - Offer pills one at a time, crush or deliver with applesauce as needed  - Discontinue feeding and notify MD (or speech pathologist) if coughing or persistent throat clearing

## 2020-02-24 VITALS
DIASTOLIC BLOOD PRESSURE: 79 MMHG | HEART RATE: 81 BPM | BODY MASS INDEX: 32.89 KG/M2 | RESPIRATION RATE: 20 BRPM | TEMPERATURE: 97 F | HEIGHT: 73 IN | SYSTOLIC BLOOD PRESSURE: 106 MMHG | WEIGHT: 248.19 LBS | OXYGEN SATURATION: 93 %

## 2020-02-24 NOTE — PROGRESS NOTES
[facility] Progress Note    Assessment and Plan:       IMPRESSIONS:    1. Atrial fibrillation with rapid ventricular response  - chronic  - metoprolol, diltiazem + Eliquis  2. Dementia, with behavioral issues  3. HTN, controlled  4.  HLD, on statin interstitial opacification in the perihilar and basilar regions has developed.  Suspect multifocal pneumonitis.

## 2020-02-24 NOTE — DISCHARGE SUMMARY
[facility] Internal Medicine Discharge Summary   Patient ID:  Troy Aggarwal  [id number]  85 year old  8/27/1940    Admit date: 2/15/2020    Discharge date and time: 2/24/20    Attending Physician: Erin Gabriel MD     Primary Care Physician: Edmar Delarosa Take 1 tablet (50 mg total) by mouth every evening. * QUEtiapine Fumarate 25 MG Tabs  Commonly known as:  SEROQUEL  Take 1 tablet (25 mg total) by mouth daily.      traMADol HCl 50 MG Tabs  Commonly known as:  ULTRAM  Take 2 tablets (100 mg total) by mo Bring a paper prescription for each of these medications  · acetaminophen 325 MG Tabs  · Amoxicillin-Pot Clavulanate 875-125 MG Tabs  · bisacodyl 10 MG Supp  · dilTIAZem HCl ER Coated Beads 360 MG Cp24  · docusate sodium 100 MG Caps  · ipratropium-albutero #Dementia with Behavior Disturbances: improved  -admitted to [place] last month for progressive confusion, completed stay in [facility] psych unit then discharged home, wife can not longer care for patient at home  -shaheen haldol stopped, continue Seroquel, per ps PROCEDURE: XR CHEST AP PORTABLE (CPT=71010) TIME: 0831 hours  COMPARISON: [facility], XR CHEST PA + LAT CHEST (NTR=[id number]), 4/30/2018, 12:41. INDICATIONS: Fever, congestion  TECHNIQUE:   Single view. FINDINGS:  CARDIAC/VASC: Cardiomegaly.

## 2020-02-24 NOTE — PROGRESS NOTES
Report given to DERICK [place] at [facility].  Waiting for portable oxygen tank to be delivered and then patient will be transported by ambulance to [facility].

## 2020-02-24 NOTE — PLAN OF CARE
Problem: Patient Centered Care  Goal: Patient preferences are identified and integrated in the patient's plan of care  Description  Interventions:  - What would you like us to know as we care for you?  I live at home with my wife  - Provide timely, comple cardiac monitoring, monitor vital signs, obtain 12 lead EKG if indicated  - Evaluate effectiveness of antiarrhythmic and heart rate control medications as ordered  - Initiate emergency measures for life threatening arrhythmias  - Monitor electrolytes and a assessment.  - Educate pt/family on patient safety including physical limitations  - Instruct pt to call for assistance with activity based on assessment  - Modify environment to reduce risk of injury  - Provide assistive devices as appropriate  - Consider Skin integrity remains intact  Description  INTERVENTIONS  - Assess and document risk factors for pressure ulcer development  - Assess and document skin integrity  - Monitor for areas of redness and/or skin breakdown  - Initiate interventions, skin care al feeding and notify MD (or speech pathologist) if coughing or persistent throat clearing or wet/gurgly vocal quality is noted  Outcome: Progressing     VSS, A+Ox2-3, forgetful, 2L of oxygen - will go home with O2.   C/o mid back pain- pain medications as ord

## 2020-02-24 NOTE — PLAN OF CARE
[place] escalantesyn. Plan to dc on po augmentin. O2 study overnight. Plan to discharge to [facility] when HME is signed and o2 is delivered to [facility]. Will continue to monitor.     Problem: Patient Centered Care  Goal: Patient preferences are identified and inte Evaluate fluid balance, assess for edema, trend weights  Outcome: Progressing  Goal: Absence of cardiac arrhythmias or at baseline  Description  INTERVENTIONS:  - Continuous cardiac monitoring, monitor vital signs, obtain 12 lead EKG if indicated  - Evalua Assess pt frequently for physical needs  - Identify cognitive and physical deficits and behaviors that affect risk of falls.   - [place] fall precautions as indicated by assessment.  - Educate pt/family on patient safety including physical limitations  - and moisture  - Encourage food from home; allow for food preferences  - Enhance eating environment  Outcome: Progressing     Problem: SKIN/TISSUE INTEGRITY - ADULT  Goal: Skin integrity remains intact  Description  INTERVENTIONS  - Assess and document risk a slow rate  - No straws  - Encourage small bites of food and small sips of liquid  - Offer pills one at a time, crush or deliver with applesauce as needed  - Discontinue feeding and notify MD (or speech pathologist) if coughing or persistent throat cleari

## 2020-02-24 NOTE — CM/SW NOTE
02/24/20 1200   Discharge disposition   Expected discharge disposition [facility]   Name of Facillity/Home Care/Hospice   ( [facility])   Discharge transportation [facility]   MDO received for discharge to  [place] [id number].

## 2020-02-26 ENCOUNTER — LAB REQUISITION (OUTPATIENT)
Dept: LAB | Facility: HOSPITAL | Age: 80
End: 2020-02-26
Payer: MEDICARE

## 2020-02-26 DIAGNOSIS — G31.83 DEMENTIA WITH LEWY BODIES (CODE) (HCC): ICD-10-CM

## 2020-02-26 PROCEDURE — 87046 STOOL CULTR AEROBIC BACT EA: CPT | Performed by: INTERNAL MEDICINE

## 2020-02-26 PROCEDURE — 87045 FECES CULTURE AEROBIC BACT: CPT | Performed by: INTERNAL MEDICINE

## 2020-02-26 PROCEDURE — 87493 C DIFF AMPLIFIED PROBE: CPT | Performed by: INTERNAL MEDICINE

## 2020-02-26 PROCEDURE — 87427 SHIGA-LIKE TOXIN AG IA: CPT | Performed by: INTERNAL MEDICINE

## 2020-02-27 LAB — C DIFF TOX B STL QL: POSITIVE

## 2020-03-02 PROBLEM — R45.1 AGITATION: Status: ACTIVE | Noted: 2020-03-02

## 2020-03-02 PROBLEM — F03.91 DEMENTIA WITH BEHAVIORAL DISTURBANCE, UNSPECIFIED DEMENTIA TYPE (HCC): Status: ACTIVE | Noted: 2020-03-02

## 2020-03-02 NOTE — CM/SW NOTE
Call serenity'santino from [facility] stating she spoke with Selin @ [facility] and initially they were going to take patient back however after speaking with their corporate office they informed Selin they are unable to take patient back until

## 2020-03-02 NOTE — ED PROVIDER NOTES
Patient Seen in: [facility] Emergency Department      History   Patient presents with:  Altered Mental Status    Stated Complaint: ams    HPI    68 yo male with h/o dementia sent from the memory care unit for agitated behavior.  He struck out at a 97.7 °F (36.5 °C)   Temp src Temporal   SpO2 94 %   O2 Device Nasal cannula       Current:/62 (BP Location: Right arm)   Pulse 97   Temp 97.5 °F (36.4 °C) (Oral)   Resp 24   Ht 182.9 cm (6')   Wt 114.9 kg   SpO2 97%   BMI 34.37 kg/m²         Physical Non- 57 (*)     All other components within normal limits   URINALYSIS WITH CULTURE REFLEX - Abnormal; Notable for the following components:    Blood Urine Large (*)     WBC Urine 6 (*)     RBC URINE 13 (*)     Bacteria Urine Few (*)     Al reassessment of your blood pressure.     Medications Prescribed:  Current Discharge Medication List                   Present on Admission  Date Reviewed: 12/19/2019          ICD-10-CM Noted POA    * (Principal) Agitation R45.1 3/2/2020 Unknown    Dementia

## 2020-03-02 NOTE — CM/SW NOTE
Pt's wife went to [place] to get his belongings. Pt calling out, yelling. resp at bedside to give a breathing treatment due to pt wheezing. Pt also has bleeding from his penis.  Carrie Mondragon at [facility] called and stated that the pt does not meet criteria for rehab bas

## 2020-03-02 NOTE — CM/SW NOTE
I spoke to the pt's wife. Updated her in regards to options for rehab. She is willing to talk with Carina Callejas at [facility] ([facility]). I spoke to Carina Callejas at [facility] and she will talk to the pt's wife and provided her with the wife's number.

## 2020-03-02 NOTE — CM/SW NOTE
Petra 8 @ [phone] - and spoke with Yung DON re: pt's case - he stated he would speak with Tiki Choi  and give [facility], [facility]. a call back. Also informed Yung @ [facility] is wife's first choice and referral has been sent.   ER

## 2020-03-02 NOTE — CM/SW NOTE
Marj OROPEZA RN stated patient's wife requested to speak with [facility]. - spoke with patient's wife Dangelo Castle and updated her on [facility] no beds at this time and that [facility]. left message for Angel Cantu @ [facility] 8205.   Also obtained 2 more SNF's

## 2020-03-02 NOTE — ED INITIAL ASSESSMENT (HPI)
Arrived via ems for increased combativeness tonight. Pt lives at [facility].  Writer spoke with Arian Marrero RN from [facility] who repots patient is normally agitated per baseline, but tonight starting at 10pm pt has been hitting staff, throwing objects

## 2020-03-02 NOTE — RESPIRATORY THERAPY NOTE
Unable to communicate with pt for cpap evaluation at this time. Pt is yelling and seems to be confused. Will try again later.

## 2020-03-02 NOTE — H&P
[facility] Hospitalist H&P     CC: Patient presents with:  Altered Mental Status       PCP: Yadira Ruiz MD    Admission Date: 3/2/2020    ASSESSMENT / PLAN:   Mr. Dalila Lizarraga a 67year old male with PMH of HTN, cirrhosis, dementia with behavior disturbances, O who presented to the ED after aggressive behavior exhibited at SNF. Patient was unable to be placed in the ED therefor admitted for ongoing SNF referrals. Patient was residing at home until recent admission 2/15-2/24 for behavior issues and placement. Tobacco Use      Smoking status: Former Smoker        Packs/day: 1.00        Years: 25.00        Pack years: 25        Types: Cigarettes        Quit date: 1994        Years since quittin.7      Smokeless tobacco: Never Used    Alcohol use:  No

## 2020-03-02 NOTE — ED PROVIDER NOTES
Patient continues with agitation in the ED. He was given an additional dose of Ativan. Unable to find suitable placement for the patient. Will admit the patient for further evaluation and possible medication adjustment.   Discussed with Dr. Crispin Shin

## 2020-03-02 NOTE — ED NOTES
Orders for admission, patient is aware of plan and ready to go upstairs. Any questions, please call ED RN Marj  at extension [phone].

## 2020-03-02 NOTE — BH PROGRESS NOTE
Psych Liaison attempted to see Jyoti Chu for initial consultation at which time he was having an IV placed.     According to RN he has been in and out of orientation and actively responding to internal stimuli, grabbing at things in the air and trying to put hi

## 2020-03-02 NOTE — CM/SW NOTE
Addendum, 3/2/2020  SW received MDO for placement. Per chart review, PAUL has been working extensively with SNF facilities for LTC/Memory Care placement.      Referrals into:   - [facility] (private pay 8K, pt accepted at SNF) - wife to consider and visit placemen will need PAS screen prior to discharge. SHANI faxed information for PAS screen ([phone]). Need confirmation once complete. 1230 Addendum: SHANI spoke to Keshia - no appointment time is scheduled for wife to meet/tour C on Friday.  OK to a

## 2020-03-02 NOTE — CM/SW NOTE
Call rec'd from Erik Dee at [facility] - they have no memory care beds at this time and do not expect to until end of the week. ERCM will pursue other options.

## 2020-03-02 NOTE — CM/SW NOTE
Called Zia Diaz @ [facility] @ [phone] and left message for her re: patient placement. Awaiting callback from Efra Lin prior to [address].

## 2020-03-02 NOTE — CM/SW NOTE
Report given to [facility] re: pt's case and this RN's business card given to pt's wife.

## 2020-03-02 NOTE — CM/SW NOTE
Spoke with Krystyna Chaudhry with [facility] in [place]. He will be reaching out to the pt's wife Georgina Dobbs.

## 2020-03-02 NOTE — CM/SW NOTE
I spoke with the pt's wife. She did speak with Randolph Ansari at [facility]. Randolph Ansari it would be approx $8000/month for the pt. Eva Medina states she is not sure what she wants to do. The pt continues to have wheezing and is getting another neb treatment.  I spoke with

## 2020-03-02 NOTE — ED NOTES
Pt trying to climb out of bed. Pt repositioned, comfort measures provided. Pt audibly wheezing. Duoneb ordered.

## 2020-03-02 NOTE — ED NOTES
Clemencia from [place] called back and reporting they can not longer accept patient per their corportate office. Cheryl from ER case management notified. Dr. Martha Luna updated.

## 2020-03-02 NOTE — CM/SW NOTE
Called Lina [facility] @ :[phone] and asked her to please send referrals to patient's wife's top three SNF choices. Lina minor/eleni.

## 2020-03-02 NOTE — ED NOTES
Writer spoke with Juliana Guzman from [facility] and aware of events that occurred in ER and aware of patient return to facility this morning.      Spouse remains at bedside and aware of plan to return to [facility] of [address] [facility]

## 2020-03-03 ENCOUNTER — APPOINTMENT (OUTPATIENT)
Dept: ULTRASOUND IMAGING | Facility: HOSPITAL | Age: 80
DRG: 884 | End: 2020-03-03
Attending: HOSPITALIST
Payer: MEDICARE

## 2020-03-03 PROCEDURE — 76705 ECHO EXAM OF ABDOMEN: CPT | Performed by: HOSPITALIST

## 2020-03-03 NOTE — PLAN OF CARE
Problem: CARDIOVASCULAR - ADULT  Goal: Absence of cardiac arrhythmias or at baseline  Description  INTERVENTIONS:  - Continuous cardiac monitoring, monitor vital signs, obtain 12 lead EKG if indicated  - Evaluate effectiveness of antiarrhythmic and heart - Non-Violent Restraints  Goal: Patient will remain free from self-harm  Description  INTERVENTIONS:  - Apply the least restrictive restraint to prevent harm  - Notify patient and family of reasons restraints applied  - Assess for any contributing factors for safety. Patient very agitated/restless, calling out/yelling throughout night. Redirection as needed, medical equipment disguised.   Dr. Marleny Garcia notified of increased HR/a fib, wheezing with agitation- O2 sats WNL, and some pink urine; also notified of

## 2020-03-03 NOTE — PROGRESS NOTES
Patient agitated, combative, and attempting to discontinue medical equipment. Orders in place for soft restraints and posey vest.  Will monitor restraint status frequently.

## 2020-03-03 NOTE — PHYSICAL THERAPY NOTE
Chart reviewed, discussed case with RN. Pt confused and agitated overnight, received Ativan and Haldol. Lethargic this AM and not appropriate for therapy evaluation. Per chart review and discussion with RN, pt likely not appropriate for skilled therapy.  Wi

## 2020-03-03 NOTE — PROGRESS NOTES
Patient still with agitation- yelling out, attempting to hit staff, trying to swing legs out of bed. Redirection and reorientation as able; IM haldol and ativan administered. Will continue to monitor.

## 2020-03-03 NOTE — PLAN OF CARE
Problem: Patient/Family Goals  Goal: Patient/Family Long Term Goal  Description  Patient's Long Term Goal: discharge planning to snf    Interventions:  - social work, wife to tour  - See additional Care Plan goals for specific interventions  Outcome: Pro patient stability and activity tolerance for standing, transferring and ambulating w/ or w/o assistive devices  - Assist with transfers and ambulation using safe patient handling equipment as needed  - Ensure adequate protection for wounds/incisions during Progressing     Problem: Safety Risk - Non-Violent Restraints  Goal: Patient will remain free from self-harm  Description  INTERVENTIONS:  - Apply the least restrictive restraint to prevent harm  - Notify patient and family of reasons restraints applied  - awake.   Abd distended, MD aware and ordered U/S liver.

## 2020-03-03 NOTE — PLAN OF CARE
Patient bed alarm on. Found by staff kneeling next to the bed. Patient had just been fed and was not alone more than 5 minutes. Alarm as on zone two and 3 rails were up. With 4 staff members pt assisted back to bed with lift.  Pema vest applied and subseq

## 2020-03-03 NOTE — PROGRESS NOTES
[facility] Hospitalist Progress Note     CC: Hospital Follow up    PCP: Stormy Garces MD       Assessment/Plan:     Principal Problem:    Agitation  Active Problems:    Dementia with behavioral disturbance, unspecified dementia type ([facility] [address]    67year old male wi °C)  Pulse:  [] 105  Resp:  [20-24] 22  BP: (114-146)/(54-95) 118/69      Intake/Output:    Intake/Output Summary (Last 24 hours) at 3/3/2020 1136  Last data filed at 3/3/2020 0500  Gross per 24 hour   Intake 600 ml   Output —   Net 600 ml       Last MARILEE AC   • QUEtiapine Fumarate  25 mg Oral Daily   • QUEtiapine Fumarate  50 mg Oral QPM   • Rosuvastatin Calcium  10 mg Oral Nightly   • tamsulosin HCl  0.4 mg Oral Daily   • vancomycin HCl  125 mg Oral Q6H   • valproic acid  500 mg Oral BID       ipratro

## 2020-03-04 NOTE — PROGRESS NOTES
[facility] Note: Antimicrobial Weight Based Dose Adjustment for: ceftriaxone (ROCEPHIN)    Isaac Gill is a 67year old male who has been prescribed ceftriaxone (ROCEPHIN) 1000 mg every 24 hours.     Estimated Creatinine Clearance: 58.1 mL/min (based on

## 2020-03-04 NOTE — PROGRESS NOTES
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     Temp:  [97.5 °F (36.4 °C)-99.3 °F (37.4 °C)] 97.5 °F (36.4 °C)  Pulse:  [] 89  Resp:  [20-22] 20  BP: (113-122)/(61-98) 117/61      Intake/Output:    Intake/Output Summary (Last 24 hours) at 3/4/2020 1202  Last data f Intravenous BID (Diuretic)   • lactulose  10 g Oral TID   • OXcarbazepine  150 mg Oral BID   • risperiDONE  0.5 mg Oral BID   • dilTIAZem HCl ER Coated Beads  360 mg Oral Daily   • Metoprolol Succinate ER  200 mg Oral Daily Beta Blocker   • nystatin  1 Krystian

## 2020-03-04 NOTE — PLAN OF CARE
Problem: Patient/Family Goals  Goal: Patient/Family Long Term Goal  Description  Patient's Long Term Goal: discharge planning to snf    Interventions:  - social work, wife to tour  - See additional Care Plan goals for specific interventions  Outcome: Not

## 2020-03-04 NOTE — PHYSICAL THERAPY NOTE
PHYSICAL THERAPY EVALUATION - INPATIENT     Room Number: 239/143-F  Evaluation Date: 3/4/2020  Type of Evaluation: Initial   Physician Order: PT Eval and Treat    Presenting Problem: p/w agitation, AMS  Reason for Therapy: Mobility Dysfunction and Dischar functional deficits include generalized weakness, balance deficits, decreased activity tolerance, cognitive deficits, which are below the patient's pre-admission status.  The patient's Approx Degree of Impairment: 72.57% has been calculated based on salomen RODRIGO (obstructive sleep apnea) 6/23/2014   • Primary osteoarthritis involving multiple joints 9/3/2015   • Sleep apnea      Past Surgical History  Past Surgical History:   Procedure Laterality Date   • KNEE REPLACEMENT SURGERY N/A 01/2012   • REPAIR ROTATOR Poor    ACTIVITY TOLERANCE  4L O2 NC, HR/O2 unable to read on monitor  Mild SOB and fatigue during mobility, improved with multiple seated rest    AM-PAC '6-Clicks' INPATIENT SHORT FORM - BASIC MOBILITY  How much difficulty does the patient currently have. to/from Chair/Wheelchair at assistance level: minimum assistance with none and walker - rolling     Goal #2  Current Status    Goal #3 Patient is able to ambulate 50 feet with assist device: walker - rolling at assistance level: minimum assistance   Goal #

## 2020-03-04 NOTE — PHYSICAL THERAPY NOTE
Chart reviewed, pt with agitation overnight, given Haldol and Ativan. Currently asleep and lethargic. Not appropriate for PT eval this AM. Per RN, pt wakes up around 12 pm. Will f/u later today as appropriate, schedule permitting.

## 2020-03-04 NOTE — IMAGING NOTE
***Pt to ultrasound room scouts taken by ***    ***Hx taken procedure explained questions answered     ***Patient consented. Pt to get albumin 25% 25 grams yes or no     Dr. Tommy Goff  Here scanning completed .   PLATELETS = 602  PT= 21.1  INR= 1.82    ***Time

## 2020-03-04 NOTE — CONSULTS
[facility] HOSP - [facility]    Report of Consultation    Long [place] Patient Status:  Inpatient    1940 MRN [id number]   Location [facility]5W Attending Khanh Roca MD   Hosp Day # 1 PCP Robbin Martin MD     Date of Admission:  3/2/202      Patient with history of bipolar disorder and anxiety who has difficult time and last admission and patient was stabilized but always want to go home and few time admitted to me that he tried to be calm and patient because he want to go home.   Tana Types: Hydrocodone      Comment: Recently detoxed          Current Medications:  cefTRIAXone Sodium (ROCEPHIN) 2 g in sodium chloride 0.9% 100 mL MBP/ADD-vantage, 2 g, Intravenous, Q24H  furosemide (LASIX) injection 40 mg, 40 mg, Intravenous, BID (Diuretic Take 1 capsule (360 mg total) by mouth daily. bisacodyl 10 MG Rectal Suppos, Place 1 suppository (10 mg total) rectally daily as needed. docusate sodium 100 MG Oral Cap, Take 100 mg by mouth 2 (two) times daily.   ipratropium-albuterol 0.5-2.5 (3) MG/3ML Vital Signs:     Blood pressure 122/72, pulse 103, temperature 98.7 °F (37.1 °C), temperature source Axillary, resp. rate 22, height 72\", weight 114.9 kg (253 lb 6.4 oz), SpO2 92 %.     Mental Status Exam:     Stated age obese male in hospital [facility] lay Rani Redman MD  3/3/2020

## 2020-03-05 ENCOUNTER — APPOINTMENT (OUTPATIENT)
Dept: ULTRASOUND IMAGING | Facility: HOSPITAL | Age: 80
DRG: 884 | End: 2020-03-05
Attending: HOSPITALIST
Payer: MEDICARE

## 2020-03-05 PROCEDURE — 49083 ABD PARACENTESIS W/IMAGING: CPT | Performed by: HOSPITALIST

## 2020-03-05 NOTE — PLAN OF CARE
Problem: Patient/Family Goals  Goal: Patient/Family Long Term Goal  Description  Patient's Long Term Goal: discharge planning to snf    Interventions:  - social work, wife to tour  - See additional Care Plan goals for specific interventions  3/5/2020 075 restraints applied  - Assess for any contributing factors to confusion (electrolyte disturbances, delirium, medications)  - Discontinue any unnecessary medical devices as soon as possible  - Assess the patient's physical comfort, circulation, skin conditio

## 2020-03-05 NOTE — PLAN OF CARE
Patient's restraint order renewed per MD. Patient woke up in afternoon. Able to eat dinner but unable to tolerate medications during the day. No PRN's given during this RN's shift.      Problem: Patient/Family Goals  Goal: Patient/Family Long Term Goal  Dirk algorithm/standards of care as needed  Outcome: Progressing     Problem: MUSCULOSKELETAL - ADULT  Goal: Return mobility to safest level of function  Description  INTERVENTIONS:  - Assess patient stability and activity tolerance for standing, transferring a discharge planning if the patient needs post-hospital services based on physician/LIP order or complex needs related to functional status, cognitive ability or social support system  Outcome: Progressing     Problem: Safety Risk - Non-Violent Restraints  G

## 2020-03-05 NOTE — PLAN OF CARE
Problem: Patient/Family Goals  Goal: Patient/Family Long Term Goal  Description  Patient's Long Term Goal: discharge planning to snf    Interventions:  - social work, wife to tour  - See additional Care Plan goals for specific interventions  Outcome: Pro needs  - Identify cognitive and physical deficits and behaviors that affect risk of falls.   - [place] fall precautions as indicated by assessment.  - Educate pt/family on patient safety including physical limitations  - Instruct pt to call for assistance perspectives and choices   Outcome: Progressing     Pt is confused, yelling/screaming. Pt arrived for paracentsis. Removal of 4000 ml per report. Dry dressing to abd noted. Haldol iv  given for agitation. Restraints renewed this morning per dr Amy Sanchez.  Will

## 2020-03-05 NOTE — PROGRESS NOTES
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 rate 20, height 6' (1.829 m), weight 253 lb 6.4 oz (114.9 kg), SpO2 94 %.     Temp:  [97.2 °F (36.2 °C)-98.5 °F (36.9 °C)] 98.5 °F (36.9 °C Imaging:  Us Paracentesis W Imaging (AZS=28900)    Result Date: 3/5/2020  CONCLUSION: Ultrasound guided paracentesis, as detailed above.    Supervising Physician: Lenore Oleary MD.    Dictated by (CST): Georges Wilson on 3/05/2020 at 11:09 AM

## 2020-03-05 NOTE — PLAN OF CARE
Problem: Patient/Family Goals  Goal: Patient/Family Long Term Goal  Description  Patient's Long Term Goal: discharge planning to snf    Interventions:  - social work, wife to tour  - See additional Care Plan goals for specific interventions  3/4/2020 235

## 2020-03-05 NOTE — IMAGING NOTE
3175 Pt to ultrasound room. Patient escorted by lead RN Raegan Stratton from unit. Hand off report given, 2 point non violent restraints and posey vest noted. Patient calm and stable at this time.  Scouts taken by Riki Mancia    8491 Hx taken procedure explained q

## 2020-03-05 NOTE — PROGRESS NOTES
Emy Martinez is a 67year old PMH of HTN, cirrhosis, dementia with behavior disturbances, RODRIGO, T2DM, prostate cancer, chronic a fib, bipolar disorder, MDD who presented with increased agitation.   The patient seen today for over 35-minute, follow-up evalua ROTATOR CUFF,ACUTE N/A 2009      Family History   Problem Relation Age of Onset   • Heart Disorder Father    • Heart Attack Mother    • Hypertension Mother       Social History: Social History    Tobacco Use      Smoking status: Former Smoker        Packs/ solution 125 mg, 125 mg, Oral, Q6H  haloperidol lactate (HALDOL) 5 MG/ML injection 2 mg, 2 mg, Intramuscular, Q6H PRN  LORazepam (ATIVAN) injection 2 mg, 2 mg, Intramuscular, Q4H PRN        Allergies:  No Known Allergies    Laboratory Data:  Lab Results education and support. 2.  Psychotherapy focusing on insight orientation and minimize stimulation. 3.  Continue risperidone 0.5 mg twice daily. 4.  Continue Trileptal 150 twice daily. 5.  Change Seroquel to 50 mg nightly only.   6.  Communicate with wif

## 2020-03-05 NOTE — CONSULTS
[facility] HOSP - [facility]    Report of Consultation    Shari Verduzco Patient Status:  Inpatient    1940 MRN [id number]   Location [facility]5W Attending Stu Siegel MD   Hosp Day # 3 PCP Bernice Sesay MD     Date of Admission:  3/2/2020 back pain    • Depression 9/3/2015   • DJD (degenerative joint disease) 6/23/2014   • Essential hypertension 9/3/2015   • Essential hypertension with goal blood pressure less than 130/80 7/21/2016   • Hepatosplenomegaly 7/21/2016   • High blood pressure Oral, Daily  traMADol HCl (ULTRAM) tab 100 mg, 100 mg, Oral, Q8H PRN  Normal Saline Flush 0.9 % injection 3 mL, 3 mL, Intravenous, PRN  acetaminophen (TYLENOL) tab 650 mg, 650 mg, Oral, Q6H PRN  haloperidol lactate (HALDOL) 5 MG/ML injection 1 mg, 1 mg, In nightly. apixaban 5 MG Oral Tab, Take 5 mg by mouth 2 (two) times daily. tamsulosin HCl (FLOMAX) 0.4 MG Oral Cap, Take 1 capsule by mouth daily.         Allergies  No Known Allergies    Review of Systems:   Cannot obtain    Physical Exam:   Blood pressure Enlarged, measuring 18.6 cm, slightly increased parenchymal echogenicity coarse, inhomogeneous echotexture. The hepatic contours are markedly nodular. No focal masses are identified. There is no intrahepatic biliary ductal dilatation.   GALLBLADDER/CBD: Th interstitial opacification in the perihilar and basilar regions has developed. Suspect multifocal pneumonitis.     Dictated by (CST): Thomas Ramirez MD on 2/18/2020 at 9:05     Approved by (CST): Thomas Ramirez MD on 2/18/2020 at 9:07

## 2020-03-06 ENCOUNTER — APPOINTMENT (OUTPATIENT)
Dept: GENERAL RADIOLOGY | Facility: HOSPITAL | Age: 80
DRG: 884 | End: 2020-03-06
Attending: INTERNAL MEDICINE
Payer: MEDICARE

## 2020-03-06 ENCOUNTER — APPOINTMENT (OUTPATIENT)
Dept: CT IMAGING | Facility: HOSPITAL | Age: 80
DRG: 884 | End: 2020-03-06
Attending: INTERNAL MEDICINE
Payer: MEDICARE

## 2020-03-06 PROCEDURE — 71045 X-RAY EXAM CHEST 1 VIEW: CPT | Performed by: INTERNAL MEDICINE

## 2020-03-06 PROCEDURE — 70450 CT HEAD/BRAIN W/O DYE: CPT | Performed by: INTERNAL MEDICINE

## 2020-03-06 NOTE — CM/SW NOTE
0900: SHANI requested OLU/Alondra to send updates via [facility] to [facility]. 1700 Addendum: SW followed up with spouse if she went to visit [facility] today as planned.  Spouse states she went with a friend, \"we punched it into the GPS and it brought us to a [place], Performing Laboratory: [id number] Performing Laboratory: [id number]

## 2020-03-06 NOTE — CONSULTS
[facility]    PATIENT'S NAME: RUBIA COATS   ATTENDING PHYSICIAN: Marlena Talbot MD   CONSULTING PHYSICIAN: Afia Tate MD   PATIENT ACCOUNT#:   [id number]    LOCATION:  [address] [place] [address] RECORD #:   [id number]       DATE OF BIRTH:  [birth year] pain, shortness of breath, palpitations. He denies any abdominal pain. There is no noted melena or hematuria. There are no new allergies. He has a history of arthritis. He has a history of diabetes mellitus. There is a history of cirrhosis.       PHYS intermittent mildly elevated heart rates. 2.   Continue Eliquis for his atrial fibrillation to prevent stroke. 3.   Daily Lasix and spironolactone to help with volume control with his cirrhosis. 4.   Discussed with Dr. Keyla Ellis.   5.   Records of \[facility]\""iz

## 2020-03-06 NOTE — PROGRESS NOTES
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

## 2020-03-06 NOTE — PLAN OF CARE
Pt on NC-4L. Restraints discontinue. Pt denies aob, n/v. Pain controled with medication. Safety maintained. Pt's room by the nursing station. Call light in reach. We will continue to monitor.   Problem: Patient/Family Goals  Goal: Patient/Family Long Term Go care algorithm/standards of care as needed  Outcome: Progressing     Problem: MUSCULOSKELETAL - ADULT  Goal: Return mobility to safest level of function  Description  INTERVENTIONS:  - Assess patient stability and activity tolerance for standing, transferr discharge planning if the patient needs post-hospital services based on physician/LIP order or complex needs related to functional status, cognitive ability or social support system  Outcome: Progressing     Problem: Safety Risk - Non-Violent Restraints  G

## 2020-03-06 NOTE — PLAN OF CARE
Pt was calm this morning. Restraints remained off today. Pt followed directions. Family visited at bedside. Pt CT scan completed today.       Problem: Patient/Family Goals  Goal: Patient/Family Long Term Goal  Description  Patient's Long Term Goal: discharg Progressing     Problem: MUSCULOSKELETAL - ADULT  Goal: Return mobility to safest level of function  Description  INTERVENTIONS:  - Assess patient stability and activity tolerance for standing, transferring and ambulating w/ or w/o assistive devices  - Ass services based on physician/LIP order or complex needs related to functional status, cognitive ability or social support system  Outcome: Progressing     Problem: Patient Centered Care  Goal: Patient preferences are identified and integrated in the patient

## 2020-03-06 NOTE — PROGRESS NOTES
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     Audra Fried MD    [facility] Hospitalist  Answering Service number: [phone]       Subjective:     More cooperative, less agitated following 7.1 5.9   .0* 146.0*         Recent Labs   Lab 03/03/20  0637 03/05/20  0823 03/06/20  0443   GLU 80 88 110*   BUN 17 19* 22*   CREATSERUM 1.15 1.01 1.25   GFRAA 70 82 63   GFRNAA 61 71 55*   CA 8.3* 8.4* 7.8*    144 143   K 4.2 3.7 3.6   CL 1

## 2020-03-07 NOTE — PROGRESS NOTES
[facility]  Gastroenterology Progress Note    Drea Granda Patient Status:  Inpatient    1940 MRN [id number]   Location [facility]5W Attending Corey Levi MD   Hosp Day # 5 PCP Sarah Hale MD     Subjective:  Drea Granda is a( previously noted patchy bilateral airspace disease, suggesting significantly improved pulmonary edema or pneumonia. 2. Residual discoid configuration left basilar airspace disease, favored to reflect atelectasis. 3. Cardiomegaly.  4. Lesser incidental findi stable today, tolerating so far  -Mental status, more likely related to dementia and agitation, ammonia levels are normal, no clear asterixis  -Viral hepatitis serologies negative, autoimmune serologies pending  -Remote EGD per wife, can consider outpatien

## 2020-03-07 NOTE — PLAN OF CARE
Pt denies SOB, n/v, chest pain. Pain controlled with medications. IV flushed. Medication reviewed. Call light in reach. Medication reviewed.   Problem: Patient/Family Goals  Goal: Patient/Family Long Term Goal  Description  Patient's Long Term Goal: dischar Progressing     Problem: MUSCULOSKELETAL - ADULT  Goal: Return mobility to safest level of function  Description  INTERVENTIONS:  - Assess patient stability and activity tolerance for standing, transferring and ambulating w/ or w/o assistive devices  - Ass services based on physician/LIP order or complex needs related to functional status, cognitive ability or social support system  Outcome: Progressing     Problem: Patient Centered Care  Goal: Patient preferences are identified and integrated in the patient

## 2020-03-07 NOTE — PROGRESS NOTES
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     Dorys Douglas MD    [facility] Hospitalist  Answering Service number: [id number] MCV 96.2 95.8 95.0   MCH 30.2 30.0 30.5   MCHC 31.4 31.3 32.1   RDW 17.6* 17.7* 17.2*   NEPRELIM 5.69  --   --    WBC 7.1 5.9 6.1   .0* 146.0* 118.0*         Recent Labs   Lab 03/05/20  0823 03/06/20  0443 03/06/20  1640 03/07/20  0612   GLU 88 11 3/8/2020] furosemide  40 mg Oral Daily   • iron sucrose  200 mg Intravenous Daily   • amiodarone HCl  200 mg Oral BID with meals   • spironolactone  100 mg Oral Daily   • apixaban  5 mg Oral BID   • OXcarbazepine  150 mg Oral BID   • risperiDONE  0.5 mg Or

## 2020-03-07 NOTE — PROGRESS NOTES
[facility]    Progress Note    Arnoldo Bruce  67year old  [id number]  MD Stormy Gutierrez MD    Assessment and Plan:  IMPRESSION:    1.        Behavioral disturbance with history of bipolar disorder and dementia, temperature source Oral, resp. rate 20, height 6' (1.829 m), weight 253 lb 6.4 oz (114.9 kg), SpO2 91 %. General: Alert and oriented in no apparent distress. HEENT: No focal deficits. Neck: No JVD, carotids no bruits.   Cardiac: Regular rate and rhythm, 10 mg, 10 mg, Oral, Nightly  •  tamsulosin HCl (FLOMAX) 0.4 MG cap 0.4 mg, 0.4 mg, Oral, Daily  •  traMADol HCl (ULTRAM) tab 100 mg, 100 mg, Oral, Q8H PRN  •  Normal Saline Flush 0.9 % injection 3 mL, 3 mL, Intravenous, PRN  •  acetaminophen (TYLENOL) tab

## 2020-03-07 NOTE — PROGRESS NOTES
Isaac Gill is a 67year old PMH of HTN, cirrhosis, dementia with behavior disturbances, RODRIGO, T2DM, prostate cancer, chronic a fib, bipolar disorder, MDD who presented with increased agitation.   The patient seen today for over 35-minute, follow-up evalua pressure    • Hypercholesterolemia 6/23/2014   • Hypertension 6/23/2014   • Hyperuricemia 6/23/2014   • RODRIGO (obstructive sleep apnea) 6/23/2014   • Primary osteoarthritis involving multiple joints 9/3/2015   • Sleep apnea       Past Surgical History:   Pro Nightly  tamsulosin HCl (FLOMAX) 0.4 MG cap 0.4 mg, 0.4 mg, Oral, Daily  traMADol HCl (ULTRAM) tab 100 mg, 100 mg, Oral, Q8H PRN  Normal Saline Flush 0.9 % injection 3 mL, 3 mL, Intravenous, PRN  acetaminophen (TYLENOL) tab 650 mg, 650 mg, Oral, Q6H PRN  h 3/05/2020 at 11:09 AM     Finalized by (CST): Chad Wilson on 3/05/2020 at 11:10 AM            Vitals   03/06/20 2001   BP: 135/79   Pulse: 115   Resp: 20   Temp: 98.3 °F (36.8 °C)       PHYSICAL EXAM:     The patient today is alert and oriented to s Metabolic Panel (8)      Hepatic Function Panel (7)      Total protein, peritoneal fluid Once      Albumin, fluid Once      CELL COUNT/DIFF PERITONEAL FL Once      Hepatitis Panel, Acute (4)      Actin (Smooth Muscle) Antibody      Mitochondrial (M2) Antib

## 2020-03-07 NOTE — PLAN OF CARE
Pt had an episode last night and needed haldol. Pt is still confused and not oriented x4. Pt started on digoxin. No restraints.        Problem: Patient/Family Goals  Goal: Patient/Family Long Term Goal  Description  Patient's Long Term Goal: discharge plann Progressing     Problem: MUSCULOSKELETAL - ADULT  Goal: Return mobility to safest level of function  Description  INTERVENTIONS:  - Assess patient stability and activity tolerance for standing, transferring and ambulating w/ or w/o assistive devices  - Ass services based on physician/LIP order or complex needs related to functional status, cognitive ability or social support system  Outcome: Progressing     Problem: Patient Centered Care  Goal: Patient preferences are identified and integrated in the patient

## 2020-03-08 NOTE — PROGRESS NOTES
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 Daily   • iron sucrose  200 mg Intravenous Daily   • spironolactone  100 mg Oral Daily   • apixaban  5 mg Oral BID   • OXcarbazepine  150 mg Oral BID   • risperiDONE  0.5 mg Oral BID   • dilTIAZem HCl ER Coated Beads  360 mg Oral Daily   • Metoprolol [facility]

## 2020-03-08 NOTE — PROGRESS NOTES
[facility]    Progress Note    Drea Granda  67year old  [id number]  MD Sarah Kaufman MD    Assessment and Plan:  IMPRESSION:    1.        Behavioral disturbance with history of bipolar disorder and dementia, °F (36.6 °C), temperature source Oral, resp. rate 18, height 6' (1.829 m), weight 237 lb 1.6 oz (107.5 kg), SpO2 94 %. General: Alert and oriented in no apparent distress. HEENT: No focal deficits. Neck: No JVD, carotids no bruits.   Cardiac: Regular rat (SEROQUEL) tab 25 mg, 25 mg, Oral, Daily  •  Rosuvastatin Calcium (CRESTOR) tab 10 mg, 10 mg, Oral, Nightly  •  tamsulosin HCl (FLOMAX) 0.4 MG cap 0.4 mg, 0.4 mg, Oral, Daily  •  traMADol HCl (ULTRAM) tab 100 mg, 100 mg, Oral, Q8H PRN  •  Normal Saline Flu

## 2020-03-08 NOTE — PLAN OF CARE
Patient utilizing call light for assistance during the day. Cooperative with staff. Wife does not want patient to discharge to [facility]. Received a list of facilities from [facility] and gave them to wife to review.  Notified psych MD Dr. Damon Ruano intact  Description  INTERVENTIONS  - Assess and document risk factors for pressure ulcer development  - Assess and document skin integrity  - Monitor for areas of redness and/or skin breakdown  - Initiate interventions, skin care algorithm/standards of ca post-discharge preferences of patient/family/discharge partner  - Complete POLST form as appropriate  - Assess patient's ability to be responsible for managing their own health  - Refer to Case Management Department for coordinating discharge planning if t

## 2020-03-08 NOTE — CM/SW NOTE
10: 37AM SHANI received call from RN who states MD is inquring about whether or not [facility] would be able to give the pt Haldol at night. SHANI called [facility] and spoke with Tiarra Coy the [facility] Nurse who states they would be able to.      RN states pt may be discharged today

## 2020-03-08 NOTE — PLAN OF CARE
Problem: Patient/Family Goals  Goal: Patient/Family Long Term Goal  Description  Patient's Long Term Goal: discharge planning to snf    Interventions:  - social work, wife to tour  - See additional Care Plan goals for specific interventions  Outcome: Pro patient stability and activity tolerance for standing, transferring and ambulating w/ or w/o assistive devices  - Assist with transfers and ambulation using safe patient handling equipment as needed  - Ensure adequate protection for wounds/incisions during Progressing     Problem: Patient Centered Care  Goal: Patient preferences are identified and integrated in the patient's plan of care  Description  Interventions:  - What would you like us to know as we care for you?  Pt unable to answer  - Provide timely,

## 2020-03-09 NOTE — CDS QUERY
To answer this query:   1. Click \"Edit\" button on the toolbar. 2. Type an \"X\" in the bracket for diagnosis(s) that apply. (You may also add additional clinical details as you feel necessary to substantiate your response.)  3.  Click on \"SIGN\" TAB on

## 2020-03-09 NOTE — PROGRESS NOTES
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 Labs   Lab 03/07/20  0841 03/08/20  0635 03/09/20  0610   RBC 4.42 4.24 4.24   HGB 13.5 12.9* 12.8*   HCT 42.0 41.0 40.8   MCV 95.0 96.7 96.2   MCH 30.5 30.4 30.2   MCHC 32.1 31.5 31.4   RDW 17.2* 17.0* 16.9*   WBC 6.1 5.8 5.6   .0* 92.0* 94.0* dilTIAZem HCl ER Coated Beads  360 mg Oral Daily   • Metoprolol Succinate ER  200 mg Oral Daily Beta Blocker   • nystatin  1 Application Topical BID   • Pantoprazole Sodium  40 mg Oral QAM AC   • QUEtiapine Fumarate  25 mg Oral Daily   • Rosuvastatin Calci

## 2020-03-09 NOTE — CM/SW NOTE
Per chart review, spouse/Shanda did not like [facility] and does not want pt to dc to SNF. SW followed up with [facility] - per building weekend report, spouse did NOT tour the facility this weekend.  Bed is still available/private room for pt when medically s

## 2020-03-09 NOTE — PROGRESS NOTES
[facility]    Progress Note    Kimmy Morris  67year old  [id number]  MD Hue Echols MD    Assessment and Plan:    1.        Behavioral disturbance with history of bipolar disorder and dementia, awaiting prem oriented in no apparent distress. HEENT: No focal deficits. Neck: No JVD, carotids no bruits. Cardiac: irregular rate and rhythm, S1, S2 normal, no murmur, rub or gallop. Lungs: Clear without wheezes, rales, rhonchi.      Abdomen: Soft, non-tender  Extr Oral, Nightly  •  tamsulosin HCl (FLOMAX) 0.4 MG cap 0.4 mg, 0.4 mg, Oral, Daily  •  traMADol HCl (ULTRAM) tab 100 mg, 100 mg, Oral, Q8H PRN  •  Normal Saline Flush 0.9 % injection 3 mL, 3 mL, Intravenous, PRN  •  acetaminophen (TYLENOL) tab 650 mg, 650 mg

## 2020-03-09 NOTE — PLAN OF CARE
Problem: Patient/Family Goals  Goal: Patient/Family Long Term Goal  Description  Patient's Long Term Goal: discharge planning to snf    Interventions:  - social work, wife to tour  - See additional Care Plan goals for specific interventions  Outcome: Pro needs  - Identify cognitive and physical deficits and behaviors that affect risk of falls.   - [place] fall precautions as indicated by assessment.  - Educate pt/family on patient safety including physical limitations  - Instruct pt to call for assistance perspectives and choices   Outcome: Progressing     Problem: Delirium  Goal: Minimize duration of delirium  Description  Interventions:  - Encourage use of hearing aids, eye glasses  - Promote highest level of mobility daily  - Provide frequent reorientati

## 2020-03-09 NOTE — PHYSICAL THERAPY NOTE
PHYSICAL THERAPY TREATMENT NOTE - INPATIENT     Room Number: 057/442-J       Presenting Problem: p/w agitation, AMS    Problem List  Principal Problem:    Agitation  Active Problems:    Mixed vascular and neurodegenerative dementia with behavioral disturba the patient currently need. ..   -   Moving to and from a bed to a chair (including a wheelchair)?: A Lot   -   Need to walk in hospital room?: A Lot   -   Climbing 3-5 steps with a railing?: Total     AM-PAC Score:  Raw Score: 14   Approx Degree of Impairm

## 2020-03-10 NOTE — CM/SW NOTE
SHANI spoke to spouse/Shanda, aware pt is medically cleared for discharge. Wife agrees to patient coming to [facility] today. Request for 2pm discharge. SHANI spoke to K[facility] card[facility] to accept patient at 2pm discharge today. Pt will go to room 101.      SHANI sp

## 2020-03-10 NOTE — PLAN OF CARE
Problem: CARDIOVASCULAR - ADULT  Goal: Absence of cardiac arrhythmias or at baseline  Description  INTERVENTIONS:  - Continuous cardiac monitoring, monitor vital signs, obtain 12 lead EKG if indicated  - Evaluate effectiveness of antiarrhythmic and heart ADULT  Goal: Return mobility to safest level of function  Description  INTERVENTIONS:  - Assess patient stability and activity tolerance for standing, transferring and ambulating w/ or w/o assistive devices  - Assist with transfers and ambulation using saf

## 2020-03-10 NOTE — PROGRESS NOTES
This Rn called report to DERICK galdamez at [address]. Pt dressed. IV removed. Tele removed and returned. Pt being picked up by ambulance.

## 2020-03-10 NOTE — DISCHARGE SUMMARY
General Medicine Discharge Summary     Patient ID:  Evert Odonnell  67year old  8/27/1940    Admit date: 3/2/2020    Discharge date and time: 3/10/2020  2:24 PM      Attending Physician: No att. providers found     Consults: IP CONSULT TO HOSPITALIST  ROSEMARIE OWEN 67year old male with PMH of HTN, cirrhosis, dementia with behavior disturbances, RODRIGO, T2DM, prostate cancer, chronic a fib, bipolar disorder, MDD who presented to the ED after aggressive behavior exhibited at SNF needing new placement, noted to have poss Med list     Medication List      START taking these medications    digoxin 0.125 MG Tabs  Commonly known as:  LANOXIN  Take 1 tablet (125 mcg total) by mouth daily.   Start taking on:  March 11, 2020     furosemide 40 MG Tabs  Commonly known as:  LASIX  Ta * This list has 3 medication(s) that are the same as other medications prescribed for you. Read the directions carefully, and ask your doctor or other care provider to review them with you.             CONTINUE taking these medications    acetaminophen 325 · QUEtiapine Fumarate 100 MG Tabs  · risperiDONE 1 MG/ML Soln  · spironolactone 100 MG Tabs  · traMADol HCl 50 MG Tabs  · vancomycin HCl 50 MG/ML Solr         FU  Follow-up Information     Apollo Plata MD.    Specialty:  Internal Medicine  Why:  As Julious Body

## 2020-03-10 NOTE — PROGRESS NOTES
[facility]    Progress Note    Rob Miranda  67year old  [id number]  MD Burgess Tawana Park MD    Assessment and Plan:    1.        Behavioral disturbance with history of bipolar disorder and dementia, awaiting prem distress. HEENT: No focal deficits. Neck: No JVD, carotids no bruits. Cardiac: irregular rate and rhythm, S1, S2 normal, no murmur, rub or gallop. Lungs: Clear without wheezes, rales, rhonchi.      Abdomen: Soft, non-tender  Extremities: Without clubbin injection 3 mL, 3 mL, Intravenous, PRN  •  acetaminophen (TYLENOL) tab 650 mg, 650 mg, Oral, Q6H PRN  •  haloperidol lactate (HALDOL) 5 MG/ML injection 1 mg, 1 mg, Intravenous, Q6H PRN  •  vancomycin HCl (FIRVANQ) 50 MG/ML oral solution 125 mg, 125 mg, Ora

## (undated) NOTE — IP AVS SNAPSHOT
[facility]            (For Outpatient Use Only) Initial Admit Date: 3/2/2020   Inpt/Obs Admit Date: Inpt: 3/2/20 / Obs: N/A   Discharge Date:    Helen Later:  [de-identified]   MRN: [de-identified]   CSN: [id number]   CEID: [id number]        [facility] Subscriber Name:  Jose F Benavidez :    Subscriber ID:  Pt Rel to Subscriber:    Hospital Account Financial Class: Medicare    March 10, 2020

## (undated) NOTE — LETTER
[address], [place]  Authorization for Invasive Procedures  1.  I hereby authorize Tiffany Wilson APRN , my physician and whomever may be designated as the doctor's assistant, to perform the following operation and/or procedur performed for the purposes of advancing medicine, science, and/or education, provided my identity is not revealed. If the procedure has been videotaped, the physician/surgeon will obtain the original videotape.  The hospital will not be responsible for stor My signature below affirms that prior to the time of the procedure, I have explained to the patient and/or his legal representative, the risks and benefits involved in the proposed treatment and any reasonable alternative to the proposed treatment.  I have

## (undated) NOTE — IP AVS SNAPSHOT
Patient Demographics     Address  [address] [address] Phone  [phone] [facility]) *Preferred*  [phone] [facility]) E-mail Address  [email or web address]      Emergency Contact(s)     Name [address] [place] [address] Take 100 mg by mouth 2 (two) times daily. Lizeth Howe MD         furosemide 40 MG Tabs  Commonly known as:  LASIX  Start taking on:  March 11, 2020  Next dose due: Tomorrow 9 am      Take 1 tablet (40 mg total) by mouth daily.    ASHLEY Reid Take 1 tablet (25 mg total) by mouth daily. Scooter Liu MD         QUEtiapine Fumarate 100 MG Tabs  Commonly known as:  SEROQUEL  Next dose due: Tonight 9 pm      Take 1 tablet (100 mg total) by mouth every evening.    Anton Chowdhury MD         r 8283 Given      [id number] OXcarbazepine (TRILEPTAL) tab 150 mg 03/09/20 2004 Given      [id number] OXcarbazepine (TRILEPTAL) tab 150 mg 03/10/20 0927 Given      [id number] Pantoprazole Sodium (PROTONIX) EC tab 40 mg 03/10/20 0538 Given      [id number] Les Andrés Temp  97.2 °F (36.2 °C) Filed at 03/10/2020 0923   SpO2  95 % Filed at 03/10/2020 0923      Patient's Most Recent Weight       Most Recent Value   Patient Weight  107.9 kg (237 lb 12.8 oz)         Lab Results Last 24 Hours      MAGNESIUM [[id number]] Rah Mccall GFR, -American 71 >=60 — [facility]            CBC, PLATELET; NO DIFFERENTIAL [[id number]] (Abnormal)  Resulted: 03/10/20 0741, Result status: Final result   Ordering provider:  Bin Walton MD  03/09/20 2300 Resulting lab:  [facility]    Specime Pending Labs     Order Current Status    ACTIN (SMOOTH MUSCLE) ANTIBODY In process    ALPHA-1-ANTITRYPSIN PHENOTYP In process    MITOCHONDRIAL (M2) ANTIBODY In process         H&P - H&P Note      H&P signed by Ajay Palacio MD at 3/2/2020  7:31 PM  Version Outpatient records or previous hospital records reviewed. Further recommendations pending patient's clinical course.   [facility] hospitalist to continue to follow patient while in house    Patient and/or patient's family given opportunity to ask questions and • KNEE REPLACEMENT SURGERY N/A 01/2012   • REPAIR ROTATOR CUFF,ACUTE N/A 2009        ALL:  No Known Allergies     Home Medications:  dilTIAZem HCl ER Coated Beads 360 MG Oral Capsule SR 24 Hr, Take 1 capsule (360 mg total) by mouth daily. , Disp: 30 capsule BUN 16 03/02/2020     03/02/2020    K 4.3 03/02/2020     03/02/2020    CO2 28.0 03/02/2020     03/02/2020    CA 8.3 03/02/2020       No results for input(s): TROP in the last 168 hours.       Radiology:[HP.1]         Electronically selene #Inguinal Candida:  -miconazole cream     #Hx of DM:  -last A1C 5.7% on no home meds, no need for accuchecks    FN:  - IVF: none  - Diet: mechanical soft, thin liquids    DVT Prophy: eliquis  Lines: PIV    Dispo: pending clinical course    Outpatient recor • Hypertension 6/23/2014   • Hyperuricemia 6/23/2014   • RODRIGO (obstructive sleep apnea) 6/23/2014   • Primary osteoarthritis involving multiple joints 9/3/2015   • Sleep apnea         PSH  Past Surgical History:   Procedure Laterality Date   • KNEE REPLACEM CREATSERUM 1.21   GFRAA 66   GFRNAA 57*   CA 8.3*      K 4.3      CO2 28.0     Lab Results   Component Value Date    WBC 7.1 03/02/2020    HGB 13.7 03/02/2020    HCT 43.6 03/02/2020    .0 03/02/2020    CREATSERUM 1.21 03/02/2020    BUN 1 -previously on oxy OP but discontinued last admission with confusion   -tramadol/tylenol prn     #Thrombocytopenia:  - 2:2 cirrhosis   -ok to continue eliquis     #Inguinal Candida:  -miconazole cream     #Hx of DM:  -last A1C 5.7% on no home meds, no need • Essential hypertension with goal blood pressure less than 130/80 7/21/2016   • Hepatosplenomegaly 7/21/2016   • High blood pressure    • Hypercholesterolemia 6/23/2014   • Hypertension 6/23/2014   • Hyperuricemia 6/23/2014   • RODRIGO (obstructive sleep apne HGB 13.7   HCT 43.6   MCV 96.2   MCH 30.2   MCHC 31.4   RDW 17.6*   NEPRELIM 5.69   WBC 7.1   .0*         Recent Labs   Lab 03/02/20  0445   *   BUN 16   CREATSERUM 1.21   GFRAA 66   GFRNAA 57*   CA 8.3*      K 4.3      CO2 28.0 rates from his atrial fibrillation, despite significant doses of diltiazem and metoprolol. He is asymptomatic from the elevations in his heart rate. He denies any chest pain, shortness of breath, palpitations, or syncope.      Patient does have a history creatinine 1.2, albumin 2.2. Hemoglobin 12.9, WBC count 5.9, platelet count 961,666. Review of telemetry strip shows atrial fibrillation with occasionally accelerated heart rates.       Paracentesis ultrasound shows 4057 mL of clear laith fluid that w Physical Therapy Notes (last 72 hours) (Notes from 3/7/2020  1:15 PM through 3/10/2020  1:15 PM)      Physical Therapy Note signed by Humberto Waggoner PTA at 3/9/2020  4:10 PM  Version 1 of 1    Author:  Humberto Waggoner PTA Service:  Rehab Author BP: 104/61  BP Location: Right arm  BP Method: Automatic  Patient Position: Lying[CK.2]    O2 WALK                  AM-PAC '6-Clicks' INPATIENT SHORT FORM - BASIC MOBILITY  How much difficulty does the patient currently have. .. [CK.1]  -   Turning over in b Current Status Pt amb 3 ft with with RW and mod a   Goal #4 Patient to demonstrate independence with home activity/exercise instructions provided to patient in preparation for discharge.    Goal #4   Current Status In progress   Goal #5    Goal #5   Current

## (undated) NOTE — ED AVS SNAPSHOT
Guille Bob   MRN: [id number]    Department:  [facility] Emergency Department   Date of Visit:  6/15/2018           Disclosure     Insurance plans vary and the physician(s) referred by the ER may not be covered by your plan.  Please contact yo within the next three months to obtain basic health screening including reassessment of your blood pressure.     IF THERE IS ANY CHANGE OR WORSENING OF YOUR CONDITION, CALL YOUR PRIMARY CARE PHYSICIAN AT ONCE OR RETURN IMMEDIATELY TO THE EMERGENCY DEPARTMEN